# Patient Record
Sex: FEMALE | Race: WHITE | Employment: UNEMPLOYED | URBAN - METROPOLITAN AREA
[De-identification: names, ages, dates, MRNs, and addresses within clinical notes are randomized per-mention and may not be internally consistent; named-entity substitution may affect disease eponyms.]

---

## 2018-05-23 PROBLEM — E66.01 SEVERE OBESITY (BMI 35.0-39.9) WITH COMORBIDITY (HCC): Status: ACTIVE | Noted: 2018-05-23

## 2018-07-02 ENCOUNTER — OFFICE VISIT (OUTPATIENT)
Dept: CARDIOLOGY CLINIC | Age: 48
End: 2018-07-02

## 2018-07-02 VITALS
DIASTOLIC BLOOD PRESSURE: 87 MMHG | HEIGHT: 67 IN | WEIGHT: 229 LBS | SYSTOLIC BLOOD PRESSURE: 130 MMHG | BODY MASS INDEX: 35.94 KG/M2 | HEART RATE: 74 BPM

## 2018-07-02 DIAGNOSIS — R07.9 CHEST PAIN, UNSPECIFIED TYPE: ICD-10-CM

## 2018-07-02 DIAGNOSIS — R94.39 ABNORMAL NUCLEAR STRESS TEST: ICD-10-CM

## 2018-07-02 DIAGNOSIS — I10 ESSENTIAL HYPERTENSION: Primary | ICD-10-CM

## 2018-07-02 DIAGNOSIS — E78.5 HYPERLIPIDEMIA, UNSPECIFIED HYPERLIPIDEMIA TYPE: ICD-10-CM

## 2018-07-02 DIAGNOSIS — R94.31 ABNORMAL EKG: ICD-10-CM

## 2018-07-02 LAB
A-G RATIO,AGRAT: 1.7 RATIO (ref 1.1–2.6)
ABSOLUTE LYMPHOCYTE COUNT, 10803: 2.2 K/UL (ref 1–4.8)
ALBUMIN SERPL-MCNC: 4.5 G/DL (ref 3.5–5)
ALP SERPL-CCNC: 82 U/L (ref 25–115)
ALT SERPL-CCNC: 30 U/L (ref 5–40)
ANION GAP SERPL CALC-SCNC: 12.3 MMOL/L
AST SERPL W P-5'-P-CCNC: 17 U/L (ref 10–37)
BASOPHILS # BLD: 0 K/UL (ref 0–0.2)
BASOPHILS NFR BLD: 1 % (ref 0–2)
BILIRUB SERPL-MCNC: 0.3 MG/DL (ref 0.2–1.2)
BILIRUBIN, DIRECT,CBIL: <0.2 MG/DL (ref 0–0.3)
BUN SERPL-MCNC: 10 MG/DL (ref 6–22)
CALCIUM SERPL-MCNC: 9.9 MG/DL (ref 8.4–10.5)
CHLORIDE SERPL-SCNC: 103 MMOL/L (ref 98–110)
CHOLEST SERPL-MCNC: 247 MG/DL (ref 110–200)
CO2 SERPL-SCNC: 27 MMOL/L (ref 20–32)
CREAT SERPL-MCNC: 0.7 MG/DL (ref 0.5–1.2)
EOSINOPHIL # BLD: 0.1 K/UL (ref 0–0.5)
EOSINOPHIL NFR BLD: 2 % (ref 0–6)
ERYTHROCYTE [DISTWIDTH] IN BLOOD BY AUTOMATED COUNT: 12.6 % (ref 10–15.5)
GFRAA, 66117: >60
GFRNA, 66118: >60
GLOBULIN,GLOB: 2.6 G/DL (ref 2–4)
GLUCOSE SERPL-MCNC: 106 MG/DL (ref 70–99)
GRANULOCYTES,GRANS: 52 % (ref 40–75)
HCT VFR BLD AUTO: 37.7 % (ref 35.1–48)
HDLC SERPL-MCNC: 4.6 MG/DL (ref 0–5)
HDLC SERPL-MCNC: 54 MG/DL (ref 40–59)
HGB BLD-MCNC: 12.8 G/DL (ref 11.7–16)
INR PPP: 0.94 (ref 0.89–1.29)
LDLC SERPL CALC-MCNC: 160 MG/DL (ref 50–99)
LYMPHOCYTES, LYMLT: 39 % (ref 20–45)
MCH RBC QN AUTO: 28 PG (ref 26–34)
MCHC RBC AUTO-ENTMCNC: 34 G/DL (ref 31–36)
MCV RBC AUTO: 83 FL (ref 80–95)
MONOCYTES # BLD: 0.4 K/UL (ref 0.1–1)
MONOCYTES NFR BLD: 6 % (ref 3–12)
NEUTROPHILS # BLD AUTO: 3 K/UL (ref 1.8–7.7)
PLATELET # BLD AUTO: 250 K/UL (ref 140–440)
PMV BLD AUTO: 10.2 FL (ref 9–13)
POTASSIUM SERPL-SCNC: 4.3 MMOL/L (ref 3.5–5.5)
PROT SERPL-MCNC: 7.1 G/DL (ref 6.4–8.3)
PROTHROMBIN TIME: 9.8 SEC (ref 9–13)
RBC # BLD AUTO: 4.52 M/UL (ref 3.8–5.2)
SODIUM SERPL-SCNC: 142 MMOL/L (ref 133–145)
TRIGL SERPL-MCNC: 164 MG/DL (ref 40–149)
VLDLC SERPL CALC-MCNC: 33 MG/DL (ref 8–30)
WBC # BLD AUTO: 5.8 K/UL (ref 4–11)

## 2018-07-02 RX ORDER — AMLODIPINE BESYLATE 2.5 MG/1
2.5 TABLET ORAL DAILY
Qty: 30 TAB | Refills: 3 | Status: SHIPPED | OUTPATIENT
Start: 2018-07-02 | End: 2018-11-08 | Stop reason: SDUPTHER

## 2018-07-02 RX ORDER — ASPIRIN 81 MG/1
81 TABLET ORAL DAILY
Qty: 100 TAB | Refills: 1 | Status: SHIPPED | OUTPATIENT
Start: 2018-07-02 | End: 2018-07-06

## 2018-07-02 RX ORDER — ROSUVASTATIN CALCIUM 20 MG/1
20 TABLET, COATED ORAL
Qty: 20 TAB | Refills: 2 | Status: SHIPPED | OUTPATIENT
Start: 2018-07-02 | End: 2018-10-17 | Stop reason: SDUPTHER

## 2018-07-02 NOTE — PATIENT INSTRUCTIONS
nstructions    Patients Name:  Prachi De La Torre    1. You are scheduled to have a CATH PCI on July 6  At 11:45 Please check in at 11:30.     2. Please go to [facility] and park in the outpatient parking lot that is located around to the back of the hospital and enter through the [employer]. Once you enter through the [facility] check in with the  there. The  will either give you directions or assist you in getting to the cath holding area. 3. You are not to eat anything after midnight before the procedure. Please continue to drink fluids up until midnight.  (water, juice, black coffee, soft drinks). Do not drink milk or milk products or anything with cream. You may take medications(except for diabetes) with a small sip of water before 6am on the day of the procedure. .    4. If you are diabetic, do not take your insulin/sugar pill the morning of the procedure. 5. MEDICATION INSTRUCTIONS:   Please take your morning medications with the following special instructions:    [x]          Please make sure to take your Blood pressure medication with just a little water to swallow: .    []          Take your Aspirin and/or Plavix. []          Stop your Coumadin on   and do not resume it until after the procedure.     []          Take Prednisone 60 mg and Benadryl 25 mg by mouth at Bedtime on  and again on  at . This is to prevent you from having an allergic reaction to the dye. 6. We encourage families to wait in the waiting room on the first floor while the procedure is being done. The Doctor will come out and talk with you as soon as the procedure is over. 7. There is the possibility that you may spend the night in the hospital, depending on the results of the procedure. This will be determined after the procedure is done. If angioplasty or stent is planned, you will stay at least one day.     8. If you or your family have any questions, please call our office Monday -Friday, 9:00 a.m.-4:30 p.m.,  At [phone], and ask to speak to one of the nurses.

## 2018-07-02 NOTE — PROGRESS NOTES
HISTORY OF PRESENT ILLNESS  Arnulfo Tai is a 50 y.o. female. HPI Comments: Patient with abnormal ekg,had a stress test reportedly abnormal.On follow up patient denies any chest pains,sob, palpitation or other significant symptoms. New Patient   The history is provided by the patient. This is a new problem. Associated symptoms include chest pain and shortness of breath. Pertinent negatives include no abdominal pain and no headaches. Chest Pain (Angina)    The history is provided by the patient. This is a new problem. The current episode started more than 1 week ago. The problem has been gradually worsening. The problem occurs every several days. The pain is associated with exertion and rest. The pain is present in the substernal region. The pain is moderate. The quality of the pain is described as pressure-like. The pain radiates to the left neck, left arm and left shoulder. Associated symptoms include shortness of breath. Pertinent negatives include no abdominal pain, no claudication, no cough, no dizziness, no fever, no headaches, no hemoptysis, no nausea, no orthopnea, no palpitations, no PND, no sputum production, no vomiting and no weakness. Review of Systems   Constitutional: Negative for chills and fever. HENT: Negative for nosebleeds. Eyes: Negative for blurred vision and double vision. Respiratory: Positive for shortness of breath. Negative for cough, hemoptysis, sputum production and wheezing. Cardiovascular: Positive for chest pain. Negative for palpitations, orthopnea, claudication, leg swelling and PND. Gastrointestinal: Negative for abdominal pain, heartburn, nausea and vomiting. Musculoskeletal: Negative for myalgias. Skin: Negative for rash. Neurological: Negative for dizziness, weakness and headaches. Endo/Heme/Allergies: Does not bruise/bleed easily.      Family History   Problem Relation Age of Onset    Hypertension Other     High Cholesterol Other     Other Other      AIAT deficiency maternal cousin    Ovarian Cancer Mother     Ovarian Cancer Sister        Past Medical History:   Diagnosis Date    Abnormal nuclear stress test 3/31/2014    fixed ant septal defect likely attenuation normal wall motion. no anginal symptoms     FHx: alpha 1 antitrypsin deficiency     H/O chest tube placement     RIGHT 2012    High cholesterol     History of gynecological procedure     Surrogate mother    Hypertension     Mitral valve prolapse     Nonspecific abnormal electrocardiogram (ECG) (EKG) 3/31/2014    inf q waves has been seen on and off in past no inferior wall defect on recent stress test     Pneumothorax, spontaneous, tension     RIGHT 2012    Renal cell carcinoma of right kidney ([address] 3/1/13    [id number] cc RCC FG2 s/p left radical nephrectomy for 5.1cm renal mass interpolar in 3/13     Renal mass, right     S/p nephrectomy 3/31/2014    for cancer        Past Surgical History:   Procedure Laterality Date    HX BREAST REDUCTION  2017    HX CHOLECYSTECTOMY      HX GYN      HX HYSTERECTOMY      Tummy stuck with surgery    HX NEPHRECTOMY Left 3/1/13    Radical, Dr. Neha Louise, [facility]       Social History   Substance Use Topics    Smoking status: Former Smoker    Smokeless tobacco: Former User     Quit date: 3/31/2010      Comment: quit in     Alcohol use No       Allergies   Allergen Reactions    Morphine Itching       Current Outpatient Prescriptions   Medication Sig    rosuvastatin (CRESTOR) 20 mg tablet Take 1 Tab by mouth nightly.  amLODIPine (NORVASC) 2.5 mg tablet Take 1 Tab by mouth daily.  aspirin delayed-release 81 mg tablet Take 1 Tab by mouth daily.  carvedilol (COREG) 12.5 mg tablet Take 1 Tab by mouth two (2) times daily (with meals). No current facility-administered medications for this visit.         Visit Vitals    /87    Pulse 74    Ht 5' 7\" (1.702 m)    Wt 103.9 kg (229 lb)    BMI 35.87 kg/m2 Physical Exam   Constitutional: She is oriented to person, place, and time. She appears well-developed and well-nourished. HENT:   Head: Normocephalic and atraumatic. Eyes: Conjunctivae are normal.   Neck: Neck supple. No JVD present. No tracheal deviation present. No thyromegaly present. Cardiovascular: Normal rate and regular rhythm. PMI is not displaced. Exam reveals no gallop, no S3 and no decreased pulses. No murmur heard. Pulmonary/Chest: No respiratory distress. She has no wheezes. She has no rales. She exhibits no tenderness. Abdominal: Soft. There is no tenderness. Musculoskeletal: She exhibits no edema. Neurological: She is alert and oriented to person, place, and time. Skin: Skin is warm. Psychiatric: She has a normal mood and affect. 3/2014  EXERCISE NUCLEAR STRESS TEST IS ABNORMAL. A fixed anteroseptal defect is noted suggesting possible LAD distribution disease. Given the normal wall motionone must   consider breast attenuation vs a high grade LAD stenosis   Normal LV systolic function with an EF of 88%   intermediate risk scan   Final Date: 21 March 2014 13:51    9/2015: Echocardiogram exam  NORMAL LEFT VENTRICULAR CAVITY SIZE AND SYSTOLIC FUNCTION WITH AN EJECTION FRACTION OF 65%. DIASTOLIC DYSFUNCTION NOTED. NORMAL RIGHT VENTRICULAR GLOBAL SYSTOLIC FUNCTION. TRICUSPID ANNULAR PLANE SYSTOLIC EXCURSION (TAPSE) IS 1.91 CM. TRACE AORTIC REGURGITATION. NO HEMODYNAMICALLY SIGNIFICANT VALVULAR PATHOLOGY. NO PREVIOUS REPORT FOR COMPARISON. I Have personally reviewed recent relevant labs available and discussed with patient  I have personally reviewed patient's records available from hospital and other providers and incorporated findings in patient care. 7/2018  Sinus  Rhythm   -Old inferior infarct  -Old anterior infarct. ABNORMAL       Assessment       ICD-10-CM ICD-9-CM    1.  Essential hypertension I10 401.9 rosuvastatin (CRESTOR) 20 mg tablet AMB POC EKG ROUTINE W/ 12 LEADS, INTER & REP   2. Hyperlipidemia, unspecified hyperlipidemia type E78.5 272.4 HEPATIC FUNCTION PANEL      LIPID PANEL   3. Abnormal nuclear stress test R94.39 794.39 CARDIAC CATHETERIZATION      CBC WITH AUTOMATED DIFF      PROTHROMBIN TIME + INR      METABOLIC PANEL, BASIC    Fixed anteroseptal defect in 2014. Possible CAD although wall motion and systolic function is normal.   4. Chest pain, unspecified type R07.9 786.50 CARDIAC CATHETERIZATION      CBC WITH AUTOMATED DIFF      PROTHROMBIN TIME + INR      METABOLIC PANEL, BASIC    History of angina pectoris. [address] class III. Increased symptoms. Added medication. We will proceed with cardiac catheterization. 5. Abnormal EKG R94.31 794.31     ? old mi     THE PATIENT UNDERSTANDS THAT ALTHOUGH RARE, SEVERE  UNEXPECTED COMPLICATIONS CAN OCCUR WITH EACH TYPE OF CARDIAC CATH PROCEDURE.   THESE RISKS INCLUDE,  BUT ARE NOT LIMITED TO: ALLERGIC REACTION, INFECTION, BLEEDING, BLOOD VESSEL INJURY,   KIDNEY INJURY FROM X-RAY DYE, PUNCTURE OF THE HEART/LUNGS,   EMERGENT OPEN HEART SURGERY, HEART ATTACK, STROKE, CARDIAC  ARREST OR DEATH OR NEED FOR EMERGENCY CARDIAC BYPASS SURGERY       Medications Discontinued During This Encounter   Medication Reason    rosuvastatin (CRESTOR) 20 mg tablet Reorder       Orders Placed This Encounter    CBC WITH AUTOMATED DIFF     Standing Status:   Future     Standing Expiration Date:   8/1/2018    PROTHROMBIN TIME + INR     Standing Status:   Future     Standing Expiration Date:   7/7/0406    METABOLIC PANEL, BASIC     Standing Status:   Future     Standing Expiration Date:   8/1/2018    HEPATIC FUNCTION PANEL     Standing Status:   Future     Standing Expiration Date:   12/31/2018    LIPID PANEL     Standing Status:   Future     Standing Expiration Date:   12/31/2018    CARDIAC CATHETERIZATION     Standing Status:   Future     Standing Expiration Date:   1/1/2019     Order Specific Question:   Reason for Exam:     Answer:   angina    AMB POC EKG ROUTINE W/ 12 LEADS, INTER & REP     Order Specific Question:   Reason for Exam:     Answer:   chest pain    rosuvastatin (CRESTOR) 20 mg tablet     Sig: Take 1 Tab by mouth nightly. Dispense:  20 Tab     Refill:  2    amLODIPine (NORVASC) 2.5 mg tablet     Sig: Take 1 Tab by mouth daily. Dispense:  30 Tab     Refill:  3    aspirin delayed-release 81 mg tablet     Sig: Take 1 Tab by mouth daily. Dispense:  100 Tab     Refill:  1       Follow-up Disposition:  Return in about 4 weeks (around 7/30/2018).

## 2018-07-02 NOTE — MR AVS SNAPSHOT
Mounika [place] 
 
 
 [address] [facility] 
[phone] Patient: Abbie Buckley MRN: [id number] :1970 Visit Information Date & Time Provider Department Dept. Phone Encounter #  
 2018  9:45 AM Mary Beth Fuentes MD [facility] [id number] Follow-up Instructions Return in about 4 weeks (around 2018). Your Appointments 2018 10:30 AM  
ESTABLISHED PATIENT with Mary Beth Fuentes MD  
[facility] ([address]) Appt Note: 6 wks post cath [address] [place] [address] [address]  
  
   
 [address] [address] [address] [address] Upcoming Health Maintenance Date Due Pneumococcal 19-64 Highest Risk (1 of 3 - PCV13) 10/19/1987 DTaP/Tdap/Td series (1 - Tdap) 10/19/1989 PAP AKA CERVICAL CYTOLOGY 2014 Influenza Age 5 to Adult 2018 Allergies as of 2018  Review Complete On: 2018 By: Arelis Pacheco Severity Noted Reaction Type Reactions Morphine  2012   Intolerance Itching Current Immunizations  Never Reviewed Name Date Influenza Vaccine 2012 12:00 AM  
  
 Not reviewed this visit You Were Diagnosed With   
  
 Codes Comments Essential hypertension    -  Primary ICD-10-CM: I10 
ICD-9-CM: 401.9 Hyperlipidemia, unspecified hyperlipidemia type     ICD-10-CM: E78.5 ICD-9-CM: 272.4 Abnormal nuclear stress test     ICD-10-CM: R94.39 
ICD-9-CM: 794.39 Fixed anteroseptal defect in . Possible CAD although wall motion and systolic function is normal.  
 Chest pain, unspecified type     ICD-10-CM: R07.9 ICD-9-CM: 786.50 History of angina pectoris. [address] class III. Increased symptoms. Added medication. We will proceed with cardiac catheterization. Abnormal EKG     ICD-10-CM: R94.31 
ICD-9-CM: 794.31 ? old mi Vitals BP Pulse Height(growth percentile) Weight(growth percentile) BMI OB Status 130/87 74 5' 7\" (1.702 m) 229 lb (103.9 kg) 35.87 kg/m2 Hysterectomy Smoking Status Former Smoker Vitals History BMI and BSA Data Body Mass Index Body Surface Area  
 35.87 kg/m 2 2.22 m 2 Preferred Pharmacy Pharmacy Name Phone [facility] [address] [place] [address] AT [facility] [phone] Your Updated Medication List  
  
   
This list is accurate as of 7/2/18 10:22 AM.  Always use your most recent med list. amLODIPine 2.5 mg tablet Commonly known as:  [place] Take 1 Tab by mouth daily. aspirin delayed-release 81 mg tablet Take 1 Tab by mouth daily. carvedilol 12.5 mg tablet Commonly known as:  Jean Peat Take 1 Tab by mouth two (2) times daily (with meals). rosuvastatin 20 mg tablet Commonly known as:  CRESTOR Take 1 Tab by mouth nightly. Prescriptions Sent to Pharmacy Refills  
 rosuvastatin (CRESTOR) 20 mg tablet 2 Sig: Take 1 Tab by mouth nightly. Class: Normal  
 Pharmacy: [facility] [address] [place] [address] Ph #: [phone] Route: Oral  
 amLODIPine (NORVASC) 2.5 mg tablet 3 Sig: Take 1 Tab by mouth daily. Class: Normal  
 Pharmacy: [facility] [address] [place] [address] Ph #: [phone] Route: Oral  
 aspirin delayed-release 81 mg tablet 1 Sig: Take 1 Tab by mouth daily. Class: Normal  
 Pharmacy: [facility] [address] [place] [address] [facility]: [phone] Route: Oral  
  
We Performed the Following AMB POC EKG ROUTINE W/ 12 LEADS, INTER & REP [41699 CPT(R)] Follow-up Instructions Return in about 4 weeks (around 7/30/2018). To-Do List   
 07/02/2018 Cardiac Services:  CARDIAC CATHETERIZATION   
  
 07/02/2018 Lab:  HEPATIC FUNCTION PANEL   
  
 07/02/2018 Lab:  LIPID PANEL   
  
 07/09/2018 Lab:  CBC WITH AUTOMATED DIFF   
  
 07/09/2018 Lab:  METABOLIC PANEL, BASIC   
  
 07/09/2018 Lab:  PROTHROMBIN TIME + INR Patient Instructions   
nstructions Patients Name:  Helga Santana 1. You are scheduled to have a CATH PCI on July 13  at  Please check in at   . 2. Please go to [facility] and park in the outpatient parking lot that is located around to the back of the hospital and enter through the [facility] building. Once you enter through the [facility] check in with the  there. The  will either give you directions or assist you in getting to the cath holding area. 3. You are not to eat anything after midnight before the procedure. Please continue to drink fluids up until midnight.  (water, juice, black coffee, soft drinks). Do not drink milk or milk products or anything with cream. You may take medications(except for diabetes) with a small sip of water before 6am on the day of the procedure. Barbie Davis 4. If you are diabetic, do not take your insulin/sugar pill the morning of the procedure. 5. MEDICATION INSTRUCTIONS:   Please take your morning medications with the following special instructions: 
 
[x]          Please make sure to take your Blood pressure medication with just a little water to swallow: . []          Take your Aspirin and/or Plavix. []          Stop your Coumadin on   and do not resume it until after the procedure.  
 
[]          Take Prednisone 60 mg and Benadryl 25 mg by mouth at Bedtime on  and again on  at . This is to prevent you from having an allergic reaction to the dye. 6. We encourage families to wait in the waiting room on the first floor while the procedure is being done.   The Doctor will come out and talk with you as soon as the procedure is over. 7. There is the possibility that you may spend the night in the hospital, depending on the results of the procedure. This will be determined after the procedure is done. If angioplasty or stent is planned, you will stay at least one day. 8. If you or your family have any questions, please call our office Monday Friday, 9:00 a. m.4:30 p.m.,  At [phone], and ask to speak to one of the nurses. Introducing [facility]! [facility] introduces [employer] patient portal. Now you can access parts of your medical record, email your doctor's office, and request medication refills online. 1. In your internet browser, go to [email or web address] 2. Click on the First Time User? Click Here link in the Sign In box. You will see the New Member Sign Up page. 3. Enter your [employer]way Access Code exactly as it appears below. You will not need to use this code after youve completed the sign-up process. If you do not sign up before the expiration date, you must request a new code. · Compare[employer]way Access Code: [email or web address] Expires: 8/21/2018  9:22 AM 
 
4. Enter the last four digits of your Social Security Number (xxxx) and Date of Birth (mm/dd/yyyy) as indicated and click Submit. You will be taken to the next sign-up page. 5. Create a CompareAway ID. This will be your CompareAway login ID and cannot be changed, so think of one that is secure and easy to remember. 6. Create a [employer]way password. You can change your password at any time. 7. Enter your Password Reset Question and Answer. This can be used at a later time if you forget your password. 8. Enter your e-mail address. You will receive e-mail notification when new information is available in [address] 9. Click Sign Up. You can now view and download portions of your medical record. 10. Click the Download Summary menu link to download a portable copy of your medical information. If you have questions, please visit the Frequently Asked Questions section of the [employer] website. Remember, [employer] is NOT to be used for urgent needs. For medical emergencies, dial 911. Now available from your iPhone and Android! Please provide this summary of care documentation to your next provider. Your primary care clinician is listed as Brendon Robin. If you have any questions after today's visit, please call [phone].

## 2018-07-02 NOTE — LETTER
Lauraanali Escobar 1970 
 
7/2/2018 Dear Saurav Blanco NP I had the pleasure of evaluating  Ms. Parmar in office today. Below are the relevant portions of my assessment and plan of care. ICD-10-CM ICD-9-CM 1. Essential hypertension I10 401.9 rosuvastatin (CRESTOR) 20 mg tablet AMB POC EKG ROUTINE W/ 12 LEADS, INTER & REP 2. Hyperlipidemia, unspecified hyperlipidemia type E78.5 272.4 HEPATIC FUNCTION PANEL  
   LIPID PANEL 3. Abnormal nuclear stress test R94.39 794.39 CARDIAC CATHETERIZATION  
   CBC WITH AUTOMATED DIFF PROTHROMBIN TIME + INR  
   METABOLIC PANEL, BASIC Fixed anteroseptal defect in 2014. Possible CAD although wall motion and systolic function is normal.  
4. Chest pain, unspecified type R07.9 786.50 CARDIAC CATHETERIZATION  
   CBC WITH AUTOMATED DIFF PROTHROMBIN TIME + INR  
   METABOLIC PANEL, BASIC History of angina pectoris. [facility] class III. Increased symptoms. Added medication. We will proceed with cardiac catheterization. 5. Abnormal EKG R94.31 794.31   
 ? old mi  
 
 
Current Outpatient Prescriptions Medication Sig Dispense Refill  rosuvastatin (CRESTOR) 20 mg tablet Take 1 Tab by mouth nightly. 20 Tab 2  
 amLODIPine (NORVASC) 2.5 mg tablet Take 1 Tab by mouth daily. 30 Tab 3  
 aspirin delayed-release 81 mg tablet Take 1 Tab by mouth daily. 100 Tab 1  carvedilol (COREG) 12.5 mg tablet Take 1 Tab by mouth two (2) times daily (with meals). 60 Tab 6 Orders Placed This Encounter  CBC WITH AUTOMATED DIFF Standing Status:   Future Standing Expiration Date:   8/1/2018  PROTHROMBIN TIME + INR Standing Status:   Future Standing Expiration Date:   8/1/2018  METABOLIC PANEL, BASIC Standing Status:   Future Standing Expiration Date:   8/1/2018  
 HEPATIC FUNCTION PANEL Standing Status:   Future Standing Expiration Date:   12/31/2018  LIPID PANEL Standing Status:   Future Standing Expiration Date:   12/31/2018  CARDIAC CATHETERIZATION Standing Status:   Future Standing Expiration Date:   1/1/2019 Order Specific Question:   Reason for Exam: Answer:   angina  AMB POC EKG ROUTINE W/ 12 LEADS, INTER & REP Order Specific Question:   Reason for Exam: Answer:   chest pain  rosuvastatin (CRESTOR) 20 mg tablet Sig: Take 1 Tab by mouth nightly. Dispense:  20 Tab Refill:  2  
 amLODIPine (NORVASC) 2.5 mg tablet Sig: Take 1 Tab by mouth daily. Dispense:  30 Tab Refill:  3  
 aspirin delayed-release 81 mg tablet Sig: Take 1 Tab by mouth daily. Dispense:  100 Tab Refill:  1 If you have questions, please do not hesitate to call me. I look forward to following MsNataliia Parmar along with you. Sincerely, Kaushik Alberts MD

## 2018-07-06 ENCOUNTER — HOSPITAL ENCOUNTER (OUTPATIENT)
Age: 48
Setting detail: OUTPATIENT SURGERY
Discharge: HOME | End: 2018-07-06
Attending: INTERNAL MEDICINE | Admitting: INTERNAL MEDICINE
Payer: COMMERCIAL

## 2018-07-06 VITALS
WEIGHT: 229 LBS | SYSTOLIC BLOOD PRESSURE: 122 MMHG | HEIGHT: 67 IN | HEART RATE: 86 BPM | RESPIRATION RATE: 18 BRPM | DIASTOLIC BLOOD PRESSURE: 74 MMHG | BODY MASS INDEX: 35.94 KG/M2 | OXYGEN SATURATION: 96 %

## 2018-07-06 DIAGNOSIS — R07.9 ACUTE CHEST PAIN: ICD-10-CM

## 2018-07-06 DIAGNOSIS — R94.39: ICD-10-CM

## 2018-07-06 LAB — END DIASTOLIC PRESSURE: 24

## 2018-07-06 PROCEDURE — 77030019569 HC BND COMPR RAD TERU -B: Performed by: INTERNAL MEDICINE

## 2018-07-06 PROCEDURE — 74011636320 HC RX REV CODE- 636/320: Performed by: INTERNAL MEDICINE

## 2018-07-06 PROCEDURE — 77030015766: Performed by: INTERNAL MEDICINE

## 2018-07-06 PROCEDURE — 74011000250 HC RX REV CODE- 250: Performed by: INTERNAL MEDICINE

## 2018-07-06 PROCEDURE — 74011250636 HC RX REV CODE- 250/636: Performed by: INTERNAL MEDICINE

## 2018-07-06 PROCEDURE — 77030013797 HC KT TRNSDUC PRSSR EDWD -A: Performed by: INTERNAL MEDICINE

## 2018-07-06 PROCEDURE — 74011250637 HC RX REV CODE- 250/637: Performed by: INTERNAL MEDICINE

## 2018-07-06 PROCEDURE — 93458 L HRT ARTERY/VENTRICLE ANGIO: CPT | Performed by: INTERNAL MEDICINE

## 2018-07-06 PROCEDURE — C1894 INTRO/SHEATH, NON-LASER: HCPCS | Performed by: INTERNAL MEDICINE

## 2018-07-06 RX ORDER — GUAIFENESIN 100 MG/5ML
LIQUID (ML) ORAL AS NEEDED
Status: DISCONTINUED | OUTPATIENT
Start: 2018-07-06 | End: 2018-07-06 | Stop reason: HOSPADM

## 2018-07-06 RX ORDER — HEPARIN SODIUM 1000 [USP'U]/ML
INJECTION, SOLUTION INTRAVENOUS; SUBCUTANEOUS
Status: DISCONTINUED
Start: 2018-07-06 | End: 2018-07-06 | Stop reason: HOSPADM

## 2018-07-06 RX ORDER — SODIUM CHLORIDE 0.9 % (FLUSH) 0.9 %
5-10 SYRINGE (ML) INJECTION AS NEEDED
Status: DISCONTINUED | OUTPATIENT
Start: 2018-07-06 | End: 2018-07-06 | Stop reason: HOSPADM

## 2018-07-06 RX ORDER — GUAIFENESIN 100 MG/5ML
LIQUID (ML) ORAL
Status: DISCONTINUED
Start: 2018-07-06 | End: 2018-07-06 | Stop reason: HOSPADM

## 2018-07-06 RX ORDER — SODIUM CHLORIDE 0.9 % (FLUSH) 0.9 %
5-10 SYRINGE (ML) INJECTION EVERY 8 HOURS
Status: DISCONTINUED | OUTPATIENT
Start: 2018-07-06 | End: 2018-07-06 | Stop reason: HOSPADM

## 2018-07-06 RX ORDER — NITROGLYCERIN 40 MG/100ML
INJECTION INTRAVENOUS
Status: DISCONTINUED
Start: 2018-07-06 | End: 2018-07-06 | Stop reason: HOSPADM

## 2018-07-06 RX ORDER — LIDOCAINE HYDROCHLORIDE 10 MG/ML
INJECTION, SOLUTION EPIDURAL; INFILTRATION; INTRACAUDAL; PERINEURAL AS NEEDED
Status: DISCONTINUED | OUTPATIENT
Start: 2018-07-06 | End: 2018-07-06 | Stop reason: HOSPADM

## 2018-07-06 RX ORDER — LIDOCAINE HYDROCHLORIDE 10 MG/ML
INJECTION, SOLUTION EPIDURAL; INFILTRATION; INTRACAUDAL; PERINEURAL
Status: DISCONTINUED
Start: 2018-07-06 | End: 2018-07-06 | Stop reason: HOSPADM

## 2018-07-06 RX ORDER — VERAPAMIL HYDROCHLORIDE 2.5 MG/ML
INJECTION, SOLUTION INTRAVENOUS AS NEEDED
Status: DISCONTINUED | OUTPATIENT
Start: 2018-07-06 | End: 2018-07-06 | Stop reason: HOSPADM

## 2018-07-06 RX ORDER — VERAPAMIL HYDROCHLORIDE 2.5 MG/ML
INJECTION, SOLUTION INTRAVENOUS
Status: DISCONTINUED
Start: 2018-07-06 | End: 2018-07-06 | Stop reason: HOSPADM

## 2018-07-06 RX ORDER — HEPARIN SODIUM 1000 [USP'U]/ML
INJECTION, SOLUTION INTRAVENOUS; SUBCUTANEOUS AS NEEDED
Status: DISCONTINUED | OUTPATIENT
Start: 2018-07-06 | End: 2018-07-06 | Stop reason: HOSPADM

## 2018-07-06 NOTE — PROGRESS NOTES
RBand removed from right wrist without complication. No hematoma or bleeding noted at this time. Pt instructed on post cath instructions. Pt verbalized understanding.

## 2018-07-06 NOTE — PROGRESS NOTES
Pt provided discharge instructions. All questions answered and pt verbalized understanding. PIV removed. No hematoma or bleeding noted at this time. Procedure site within normal limits. Pt escorted to front of building for discharge.

## 2018-07-06 NOTE — H&P
H&P update    No new symptoms  Risks, benefits and alternatives of LHC/ptca/stent explained to patient.   All questions answered

## 2018-07-06 NOTE — Clinical Note
TRANSFER - OUT REPORT:  
 
Verbal report given to: mike posada RN. Report consisted of patient's Situation, Background, Assessment and  
Recommendations(SBAR). Opportunity for questions and clarification was provided. Patient transported with a Cardiac Cath Tech / Patient Care Tech. Patient transported to: Luca Valerio

## 2018-07-06 NOTE — IP AVS SNAPSHOT
[place] 
 
 
 [address] [facility] [address] [facility] [address] Patient: Annette Tadeo MRN: [id number] :1970 About your hospitalization You were admitted on:  2018 You last received care in the:  [facility] You were discharged on:  2018 Why you were hospitalized Your primary diagnosis was:  Not on File Follow-up Information Follow up With Details Comments Contact Info Francy Taylor NP   [address] [facility] [address] [facility] [address] 
[phone] Judson Beauchamp MD Follow up in 2 week(s)  [address] [place] [address] [facility] [address] 
[phone] Your Scheduled Appointments 2018 10:30 AM EDT  
ESTABLISHED PATIENT with Peter Maguire MD  
[facility] [place] ([address]) [place] [address] [facility] [address]  
[phone] Discharge Orders None A check sim indicates which time of day the medication should be taken. My Medications CONTINUE taking these medications Instructions Each Dose to Equal  
 Morning Noon Evening Bedtime  
 amLODIPine 2.5 mg tablet Commonly known as:  Ministerio Garceser Your last dose was: Your next dose is: Take 1 Tab by mouth daily. 2.5 mg  
    
   
   
   
  
 carvedilol 12.5 mg tablet Commonly known as:  Herman Garcia Your last dose was: Your next dose is: Take 1 Tab by mouth two (2) times daily (with meals). 12.5 mg  
    
   
   
   
  
 rosuvastatin 20 mg tablet Commonly known as:  CRESTOR Your last dose was: Your next dose is: Take 1 Tab by mouth nightly. 20 mg  
    
   
   
   
  
  
STOP taking these medications   
 aspirin delayed-release 81 mg tablet Discharge Instructions DISCHARGE SUMMARY from Nurse PATIENT INSTRUCTIONS: 
 
 
F-face looks uneven A-arms unable to move or move unevenly S-speech slurred or non-existent T-time-call 911 as soon as signs and symptoms begin-DO NOT go Back to bed or wait to see if you get better-TIME IS BRAIN. Warning Signs of HEART ATTACK Call 911 if you have these symptoms: 
? Chest discomfort. Most heart attacks involve discomfort in the center of the chest that lasts more than a few minutes, or that goes away and comes back. It can feel like uncomfortable pressure, squeezing, fullness, or pain. ? Discomfort in other areas of the upper body. Symptoms can include pain or discomfort in one or both arms, the back, neck, jaw, or stomach. ? Shortness of breath with or without chest discomfort. ? Other signs may include breaking out in a cold sweat, nausea, or lightheadedness. Don't wait more than five minutes to call [address]! Fast action can save your life. Calling 911 is almost always the fastest way to get lifesaving treatment. Emergency Medical Services staff can begin treatment when they arrive  up to an hour sooner than if someone gets to the hospital by car. The discharge information has been reviewed with the patient. The patient verbalized understanding. Discharge medications reviewed with the patient and appropriate educational materials and side effects teaching were provided. ___________________________________________________________________________________________________________________________________ Cardiac Catheterization *Check the puncture site frequently for swelling or bleeding. If you see any bleeding, lie down and apply pressure over the area with a clean town or washcloth. Notify your doctor for any redness, swelling, drainage or oozing from the puncture site. Notify your doctor for any fever or chills. *If the leg or arm with the puncture becomes cold, numb or painful, call Dr Marta Simmons *Activity should be limited for the next 48 hours. Climb stairs as little as possible and avoid any stooping, bending or strenuous activity for 48 hours. No heavy lifting (anything over 10 pounds) for three days. *Do not drive for 48 hours. *You may resume your usual diet. Drink more fluids than usual. 
 
*Have a responsible person drive you home and stay with you for at least 24 hours after your heart catheterization/angiography. *You may remove the bandage from your Right and Arm in 24 hours. You may shower in 24 hours. No tub baths, hot tubs or swimming for one week. Do not place any lotions, creams, powders, ointments over the puncture site for one week. You may place a clean band-aid over the puncture site each day for 5 days. Change this daily. Patient armband removed and shredded Introducing [facility]! Stacia Duke introduces [facility] ([facility] [place]) patient portal. Now you can access parts of your medical record, email your doctor's office, and request medication refills online. 1. In your internet browser, go to [email or web address] 2. Click on the First Time User? Click Here link in the Sign In box. You will see the New Member Sign Up page. 3. Enter your [facility] ([facility] [place]) Access Code exactly as it appears below. You will not need to use this code after youve completed the sign-up process. If you do not sign up before the expiration date, you must request a new code. · [facility] ([facility] [place]) Access Code: [email or web address] Expires: 8/21/2018  9:22 AM 
 
 4. Enter the last four digits of your Social Security Number (xxxx) and Date of Birth (mm/dd/yyyy) as indicated and click Submit. You will be taken to the next sign-up page. 5. Create a Acusphere ID. This will be your Acusphere login ID and cannot be changed, so think of one that is secure and easy to remember. 6. Create a [employer] password. You can change your password at any time. 7. Enter your Password Reset Question and Answer. This can be used at a later time if you forget your password. 8. Enter your e-mail address. You will receive e-mail notification when new information is available in [address] 9. Click Sign Up. You can now view and download portions of your medical record. 10. Click the Download Summary menu link to download a portable copy of your medical information. If you have questions, please visit the Frequently Asked Questions section of the Ac[employer]here website. Remember, [employer] is NOT to be used for urgent needs. For medical emergencies, dial 911. Now available from your iPhone and Android! Introducing Xavi Bill As a [facility] patient, I wanted to make you aware of our electronic visit tool called Xavi SaldanaSafety Hound. [facility] 24/7 allows you to connect within minutes with a medical provider 24 hours a day, seven days a week via a mobile device or tablet or logging into a secure website from your computer. You can access Xavi Lesfin from anywhere in the [place]. A virtual visit might be right for you when you have a simple condition and feel like you just dont want to get out of bed, or cant get away from work for an appointment, when your regular [facility]ley provider is not available (evenings, weekends or holidays), or when youre out of town and need minor care.   Electronic visits cost only $49 and if the Xavi Bill provider determines a prescription is needed to treat your condition, one can be electronically transmitted to a nearby pharmacy*. Please take a moment to enroll today if you have not already done so. The enrollment process is free and takes just a few minutes. To enroll, please download the [employer] 24/7 [email or web address] to your tablet or phone, or visit [email or web address] to enroll on your computer. And, as an [address] [place] [address] patient with a [employer] account, the results of your visits will be scanned into your electronic medical record and your primary care provider will be able to view the scanned results. We urge you to continue to see your regular [employer] provider for your ongoing medical care. And while your primary care provider may not be the one available when you seek a Intelligize virtual visit, the peace of mind you get from getting a real diagnosis real time can be priceless. For more information on Intelligize, view our Frequently Asked Questions (FAQs) at [email or web address]. Sincerely, 
 
Teresa Guallpa MD 
Chief Medical Officer [facility] Viktoria Morales *:  certain medications cannot be prescribed via Intelligize Providers Seen During Your Hospitalization Provider Specialty Primary office phone Felipe Causey MD Cardiology [phone] Your Primary Care Physician (PCP) Primary Care Physician Office Phone Office Fax Ananya Vu [phone] You are allergic to the following Allergen Reactions Morphine Itching Recent Documentation Height Weight Breastfeeding? BMI OB Status Smoking Status 1.702 m 103.9 kg No 35.87 kg/m2 Hysterectomy Former Smoker Emergency Contacts Name Discharge Info Relation Home Work Mobile Dennis Shultz  Spouse [3] [phone] Micah Parmar  Spouse [3] [phone] Liam Magallon  Parent [1] [phone] Patient Belongings The following personal items are in your possession at time of discharge: 
  Dental Appliances: None  Visual Aid: None Please provide this summary of care documentation to your next provider. Signatures-by signing, you are acknowledging that this After Visit Summary has been reviewed with you and you have received a copy. Patient Signature:  ____________________________________________________________ Date:  ____________________________________________________________  
  
Otto Braintree Provider Signature:  ____________________________________________________________ Date:  ____________________________________________________________

## 2018-07-06 NOTE — DISCHARGE INSTRUCTIONS
DISCHARGE SUMMARY from Nurse    PATIENT INSTRUCTIONS:    After general anesthesia or intravenous sedation, for 24 hours or while taking prescription Narcotics:  · Limit your activities  · Do not drive and operate hazardous machinery  · Do not make important personal or business decisions  · Do  not drink alcoholic beverages  · If you have not urinated within 8 hours after discharge, please contact your surgeon on call. Report the following to your surgeon:  · Excessive pain, swelling, redness or odor of or around the surgical area  · Temperature over 100.5  · Nausea and vomiting lasting longer than 4 hours or if unable to take medications  · Any signs of decreased circulation or nerve impairment to extremity: change in color, persistent  numbness, tingling, coldness or increase pain  · Any questions    What to do at Home:  Recommended activity: Activity as tolerated and no driving for today,     If you experience any of the following symptoms fever greater than 100.5, bleeding, swelling, numbness or tingling down your extremities, redness, puss from site, please follow up with emergency services. *  Please give a list of your current medications to your Primary Care Provider. *  Please update this list whenever your medications are discontinued, doses are      changed, or new medications (including over-the-counter products) are added. *  Please carry medication information at all times in case of emergency situations. These are general instructions for a healthy lifestyle:    No smoking/ No tobacco products/ Avoid exposure to second hand smoke  Surgeon General's Warning:  Quitting smoking now greatly reduces serious risk to your health.     Obesity, smoking, and sedentary lifestyle greatly increases your risk for illness    A healthy diet, regular physical exercise & weight monitoring are important for maintaining a healthy lifestyle    You may be retaining fluid if you have a history of heart failure or if you experience any of the following symptoms:  Weight gain of 3 pounds or more overnight or 5 pounds in a week, increased swelling in our hands or feet or shortness of breath while lying flat in bed. Please call your doctor as soon as you notice any of these symptoms; do not wait until your next office visit. Recognize signs and symptoms of STROKE:    F-face looks uneven    A-arms unable to move or move unevenly    S-speech slurred or non-existent    T-time-call 911 as soon as signs and symptoms begin-DO NOT go       Back to bed or wait to see if you get better-TIME IS BRAIN. Warning Signs of HEART ATTACK     Call 911 if you have these symptoms:   Chest discomfort. Most heart attacks involve discomfort in the center of the chest that lasts more than a few minutes, or that goes away and comes back. It can feel like uncomfortable pressure, squeezing, fullness, or pain.  Discomfort in other areas of the upper body. Symptoms can include pain or discomfort in one or both arms, the back, neck, jaw, or stomach.  Shortness of breath with or without chest discomfort.  Other signs may include breaking out in a cold sweat, nausea, or lightheadedness. Don't wait more than five minutes to call 911 - MINUTES MATTER! Fast action can save your life. Calling 911 is almost always the fastest way to get lifesaving treatment. Emergency Medical Services staff can begin treatment when they arrive -- up to an hour sooner than if someone gets to the hospital by car. The discharge information has been reviewed with the patient. The patient verbalized understanding. Discharge medications reviewed with the patient and appropriate educational materials and side effects teaching were provided.   ___________________________________________________________________________________________________________________________________                     Cardiac Catheterization    *Check the puncture site frequently for swelling or bleeding. If you see any bleeding, lie down and apply pressure over the area with a clean town or washcloth. Notify your doctor for any redness, swelling, drainage or oozing from the puncture site. Notify your doctor for any fever or chills. *If the leg or arm with the puncture becomes cold, numb or painful, call Dr Heather Monroy     *Activity should be limited for the next 48 hours. Climb stairs as little as possible and avoid any stooping, bending or strenuous activity for 48 hours. No heavy lifting (anything over 10 pounds) for three days. *Do not drive for 48 hours. *You may resume your usual diet. Drink more fluids than usual.    *Have a responsible person drive you home and stay with you for at least 24 hours after your heart catheterization/angiography. *You may remove the bandage from your Right and Arm in 24 hours. You may shower in 24 hours. No tub baths, hot tubs or swimming for one week. Do not place any lotions, creams, powders, ointments over the puncture site for one week. You may place a clean band-aid over the puncture site each day for 5 days. Change this daily.     Patient armband removed and shredded

## 2018-07-06 NOTE — Clinical Note
TRANSFER - IN REPORT:  
 
Verbal report received from: mike posada rn. Report consisted of patient's Situation, Background, Assessment and  
Recommendations(SBAR). Opportunity for questions and clarification was provided. Assessment completed upon patient's arrival to unit and care assumed. Patient transported with a Cardiac Cath Tech / Patient Care Tech.

## 2018-07-06 NOTE — Clinical Note
MACD (Max Contrast Calc Dose): 
Patient weight (kg) = 103. Creatinine level (mg/dL) = 0.7. Contrast concentration (mg/mL) = 300. MACD = 300 mL.

## 2018-07-31 ENCOUNTER — OFFICE VISIT (OUTPATIENT)
Dept: CARDIOLOGY CLINIC | Age: 48
End: 2018-07-31

## 2018-07-31 VITALS
DIASTOLIC BLOOD PRESSURE: 84 MMHG | BODY MASS INDEX: 36.1 KG/M2 | HEART RATE: 69 BPM | HEIGHT: 67 IN | SYSTOLIC BLOOD PRESSURE: 129 MMHG | WEIGHT: 230 LBS

## 2018-07-31 DIAGNOSIS — E78.5 HYPERLIPIDEMIA, UNSPECIFIED HYPERLIPIDEMIA TYPE: ICD-10-CM

## 2018-07-31 DIAGNOSIS — I10 ESSENTIAL HYPERTENSION: ICD-10-CM

## 2018-07-31 DIAGNOSIS — I20.9 ANGINA PECTORIS (HCC): Primary | ICD-10-CM

## 2018-07-31 DIAGNOSIS — Z99.89 OSA ON CPAP: ICD-10-CM

## 2018-07-31 DIAGNOSIS — G47.33 OSA ON CPAP: ICD-10-CM

## 2018-07-31 NOTE — PROGRESS NOTES
HISTORY OF PRESENT ILLNESS Jovita Crowder is a 50 y.o. female. HPI Comments: Patient with abnormal ekg,had a stress test reportedly abnormal.On follow up patient denies any chest pains,sob, palpitation or other significant symptoms. Cholesterol Problem Associated symptoms include chest pain and shortness of breath. Pertinent negatives include no abdominal pain and no headaches. Hypertension Associated symptoms include chest pain and shortness of breath. Pertinent negatives include no abdominal pain and no headaches. New Patient The history is provided by the patient. This is a new problem. Associated symptoms include chest pain and shortness of breath. Pertinent negatives include no abdominal pain and no headaches. Chest Pain (Angina) The history is provided by the patient. This is a new problem. The current episode started more than 1 week ago. The problem has been gradually worsening. The problem occurs every several days. The pain is associated with exertion and rest. The pain is present in the substernal region. The pain is moderate. The quality of the pain is described as pressure-like. The pain radiates to the left neck, left arm and left shoulder. Associated symptoms include shortness of breath. Pertinent negatives include no abdominal pain, no claudication, no cough, no dizziness, no fever, no headaches, no hemoptysis, no nausea, no orthopnea, no palpitations, no PND, no sputum production, no vomiting and no weakness. Review of Systems Constitutional: Negative for chills and fever. HENT: Negative for nosebleeds. Eyes: Negative for blurred vision and double vision. Respiratory: Positive for shortness of breath. Negative for cough, hemoptysis, sputum production and wheezing. Cardiovascular: Positive for chest pain. Negative for palpitations, orthopnea, claudication, leg swelling and PND. Gastrointestinal: Negative for abdominal pain, heartburn, nausea and vomiting. Musculoskeletal: Negative for myalgias. Skin: Negative for rash. Neurological: Negative for dizziness, weakness and headaches. Endo/Heme/Allergies: Does not bruise/bleed easily. Family History Problem Relation Age of Onset  Hypertension Other  High Cholesterol Other  Other Other AIAT deficiency maternal cousin  Ovarian Cancer Mother  Ovarian Cancer Sister Past Medical History:  
Diagnosis Date  Abnormal nuclear stress test 3/31/2014  
 fixed ant septal defect likely attenuation normal wall motion. no anginal symptoms  Angina pectoris ([facility] [address] 2018  
 no cad possible vasospastic feels better with amlodipine  FHx: alpha 1 antitrypsin deficiency  H/O chest tube placement RIGHT 2012  High cholesterol  History of gynecological procedure Surrogate mother  Hypertension  Mitral valve prolapse  Nonspecific abnormal electrocardiogram (ECG) (EKG) 3/31/2014 inf q waves has been seen on and off in past no inferior wall defect on recent stress test   
 Pneumothorax, spontaneous, tension RIGHT 2012  Renal cell carcinoma of right kidney ([facility] [address] 3/1/13 [id number] cc RCC FG2 s/p left radical nephrectomy for 5.1cm renal mass interpolar in 3/13  Renal mass, right  S/p nephrectomy 3/31/2014  
 for cancer Past Surgical History:  
Procedure Laterality Date  HX BREAST REDUCTION  2017  HX CHOLECYSTECTOMY  HX GYN    HX HYSTERECTOMY Tummy stuck with surgery  HX NEPHRECTOMY Left 3/1/13 Radical, Dr. Tabitha Dawson, [facility] Social History Substance Use Topics  Smoking status: Former Smoker  Smokeless tobacco: Former User Quit date: 3/31/2010 Comment: quit in   Alcohol use No  
 
 
Allergies Allergen Reactions  Morphine Itching Current Outpatient Prescriptions Medication Sig  
 rosuvastatin (CRESTOR) 20 mg tablet Take 1 Tab by mouth nightly.   
 amLODIPine (NORVASC) 2.5 mg tablet Take 1 Tab by mouth daily.  carvedilol (COREG) 12.5 mg tablet Take 1 Tab by mouth two (2) times daily (with meals). No current facility-administered medications for this visit. Facility-Administered Medications Ordered in Other Visits Medication Dose Route Frequency  iopamidol (ISOVUE 300) 61 % contrast injection    PRN Visit Vitals  /84  Pulse 69  Ht 5' 7\" (1.702 m)  Wt 104.3 kg (230 lb)  BMI 36.02 kg/m2 Physical Exam  
Constitutional: She is oriented to person, place, and time. She appears well-developed and well-nourished. HENT:  
Head: Normocephalic and atraumatic. Eyes: Conjunctivae are normal.  
Neck: Neck supple. No JVD present. No tracheal deviation present. No thyromegaly present. Cardiovascular: Normal rate and regular rhythm. PMI is not displaced. Exam reveals no gallop, no S3 and no decreased pulses. No murmur heard. Pulmonary/Chest: No respiratory distress. She has no wheezes. She has no rales. She exhibits no tenderness. Abdominal: Soft. There is no tenderness. Musculoskeletal: She exhibits no edema. Neurological: She is alert and oriented to person, place, and time. Skin: Skin is warm. Psychiatric: She has a normal mood and affect. 3/2014 EXERCISE NUCLEAR STRESS TEST IS ABNORMAL. A fixed anteroseptal defect is noted suggesting possible LAD distribution disease. Given the normal wall motionone must 
 consider breast attenuation vs a high grade LAD stenosis Normal LV systolic function with an EF of 88% 
 intermediate risk scan Final Date: 21 March 2014 13:51 
 
9/2015: Echocardiogram exam 
NORMAL LEFT VENTRICULAR CAVITY SIZE AND SYSTOLIC FUNCTION WITH AN EJECTION FRACTION OF 65%. DIASTOLIC DYSFUNCTION NOTED. NORMAL RIGHT VENTRICULAR GLOBAL SYSTOLIC FUNCTION. TRICUSPID ANNULAR PLANE SYSTOLIC EXCURSION (TAPSE) IS 1.91 CM. TRACE AORTIC REGURGITATION.  NO HEMODYNAMICALLY SIGNIFICANT VALVULAR PATHOLOGY. NO PREVIOUS REPORT FOR COMPARISON. I Have personally reviewed recent relevant labs available and discussed with patient I have personally reviewed patient's records available from hospital and other providers and incorporated findings in patient care. 7/2018 Sinus  Rhythm  
-Old inferior infarct  -Old anterior infarct. ABNORMAL Coronary Findings 7/2018 Dominance: Right Left Main The vessel was visualized by angiography. The vessel is angiographically normal.  
  
Left Anterior Descending The vessel was visualized by angiography. The vessel is angiographically normal.  
  
Left Circumflex The vessel was visualized by angiography. The vessel is angiographically normal.  
  
Right Coronary Artery The vessel was visualized by angiography. The vessel is angiographically normal.  
  
Measurements EDP: 24 Left Heart Left Ventricle The left ventricular size is normal. The left ventricular systolic function is normal. LV systolic pressure is normal. LV end diastolic pressure is normal. The estimated EF = grossly normal. There are no wall motion abnormalities in the left ventricle. The outflow tract is normal.  
 
 
Assessment ICD-10-CM ICD-9-CM 1. Angina pectoris (HCC) I20.9 413.9   
 no cad 
possible vasospastic 
feels better with amlodipine 2. Essential hypertension I10 401.9   
 stable 3. Hyperlipidemia, unspecified hyperlipidemia type E78.5 272.4 LIPID PANEL  
   HEPATIC FUNCTION PANEL  
 ldl 160 
on crestor 
follow up lab with pcp 4. JOHANNY on CPAP G47.33 327.23   
 Z99.89 V46.8   
 
7/2018 Arm and neck pains are better with amlodipine. Possible vasospastic angina. No significant CAD on cath. We will continue amlodipine. Also has high LDL. Back on statin rosuvastatin 20 mg a day. Lab with PCP in a few months There are no discontinued medications. Orders Placed This Encounter  LIPID PANEL Standing Status:   Future   Standing Expiration Date:   1/29/2019  
 HEPATIC FUNCTION PANEL Standing Status:   Future Standing Expiration Date:   1/29/2019 Follow-up Disposition: 
Return in about 6 months (around 1/31/2019).

## 2018-07-31 NOTE — MR AVS SNAPSHOT
[address] [facility] 
 
 
 [address] [facility] 
[phone] Patient: Artis Keenan MRN: [id number] :1970 Visit Information Date & Time Provider Department Dept. Phone Encounter #  
 2018 11:00 AM Gaby Bahena MD [facility] [id number] Follow-up Instructions Return in about 6 months (around 2019). Your Appointments 2019  8:30 AM  
ESTABLISHED PATIENT with Althea Mercado MD  
[facility] Appt Note: Return in about 1 year (around 2019) for review labs/imaging.  [facility] [address] [id number] [address] 10[address] [facility]  
  
   
  [facility] [address] [facility] 100 200 [facility] Upcoming Health Maintenance Date Due Pneumococcal 19-64 Highest Risk (1 of 3 - PCV13) 10/19/1987 DTaP/Tdap/Td series (1 - Tdap) 10/19/1989 PAP AKA CERVICAL CYTOLOGY 2014 Influenza Age 5 to Adult 2018 Allergies as of 2018  Review Complete On: 2018 By: Gaby Bahena MD  
  
 Severity Noted Reaction Type Reactions Morphine  2012   Intolerance Itching Current Immunizations  Never Reviewed Name Date Influenza Vaccine 2012 12:00 AM  
  
 Not reviewed this visit You Were Diagnosed With   
  
 Codes Comments Angina pectoris ([facility] [address]    -  Primary ICD-10-CM: I20.9 ICD-9-CM: 413.9 no cad 
possible vasospastic 
feels better with amlodipine Essential hypertension     ICD-10-CM: I10 
ICD-9-CM: 401.9 stable Hyperlipidemia, unspecified hyperlipidemia type     ICD-10-CM: E78.5 ICD-9-CM: 272.4 ldl 160 
on crestor 
follow up lab with pcp JOHANNY on CPAP     ICD-10-CM: G47.33, Z99.89 ICD-9-CM: 327.23, V46.8 Vitals BP Pulse Height(growth percentile) Weight(growth percentile) BMI OB Status 129/84 69 5' 7\" (1.702 m) 230 lb (104.3 kg) 36.02 kg/m2 Hysterectomy Smoking Status Former Smoker Vitals History BMI and BSA Data Body Mass Index Body Surface Area 36.02 kg/m 2 2.22 m 2 Preferred Pharmacy Pharmacy Name Phone [facility] [address] [place] [address], [facility] [phone] Your Updated Medication List  
  
   
This list is accurate as of 7/31/18 11:16 AM.  Always use your most recent med list. amLODIPine 2.5 mg tablet Commonly known as:  Voncile [place] Take 1 Tab by mouth daily. carvedilol 12.5 mg tablet Commonly known as:  Media [place] Take 1 Tab by mouth two (2) times daily (with meals). rosuvastatin 20 mg tablet Commonly known as:  CRESTOR Take 1 Tab by mouth nightly. Follow-up Instructions Return in about 6 months (around 1/31/2019). Introducing \[facility]\"" & HEALTH SERVICES! Dayana Dee introduces [employer] patient portal. Now you can access parts of your medical record, email your doctor's office, and request medication refills online. 1. In your internet browser, go to [email or web address] 2. Click on the First Time User? Click Here link in the Sign In box. You will see the New Member Sign Up page. 3. Enter your Palmap Access Code exactly as it appears below. You will not need to use this code after youve completed the sign-up process. If you do not sign up before the expiration date, you must request a new code. · Palmap Access Code: [email or web address] Expires: 8/21/2018  9:22 AM 
 
4. Enter the last four digits of your Social Security Number (xxxx) and Date of Birth (mm/dd/yyyy) as indicated and click Submit. You will be taken to the next sign-up page. 5. Create a Palmap ID. This will be your Palmap login ID and cannot be changed, so think of one that is secure and easy to remember. 6. Create a [employer] password. You can change your password at any time. 7. Enter your Password Reset Question and Answer. This can be used at a later time if you forget your password. 8. Enter your e-mail address. You will receive e-mail notification when new information is available in [address] 9. Click Sign Up. You can now view and download portions of your medical record. 10. Click the Download Summary menu link to download a portable copy of your medical information. If you have questions, please visit the Frequently Asked Questions section of the [employer] website. Remember, [employer] is NOT to be used for urgent needs. For medical emergencies, dial 911. Now available from your iPhone and Android! Please provide this summary of care documentation to your next provider. Your primary care clinician is listed as Melania Dawson. If you have any questions after today's visit, please call [phone].

## 2018-07-31 NOTE — PROGRESS NOTES
1. Have you been to the ER, urgent care clinic since your last visit? Hospitalized since your last visit? No 
 
2. Have you seen or consulted any other health care providers outside of the [facility] since your last visit? Include any pap smears or colon screening. No  
 
3. Since your last visit, have you had any of the following symptoms? .  
 
  
 
4. Have you had any blood work, X-rays or cardiac testing? 5. Where do you normally have your labs drawn? 6. Do you need any refills today?

## 2018-07-31 NOTE — LETTER
Patricia Ferraro 1970 
 
7/31/2018 Dear Monica River NP I had the pleasure of evaluating  Ms. Parmar in office today. Below are the relevant portions of my assessment and plan of care. ICD-10-CM ICD-9-CM 1. Angina pectoris (HCC) I20.9 413.9   
 no cad 
possible vasospastic 
feels better with amlodipine 2. Essential hypertension I10 401.9   
 stable 3. Hyperlipidemia, unspecified hyperlipidemia type E78.5 272.4   
 ldl 160 
on crestor 
follow up lab with pcp 4. JOHANNY on CPAP G47.33 327.23   
 Z99.89 V46.8 Current Outpatient Prescriptions Medication Sig Dispense Refill  rosuvastatin (CRESTOR) 20 mg tablet Take 1 Tab by mouth nightly. 20 Tab 2  
 amLODIPine (NORVASC) 2.5 mg tablet Take 1 Tab by mouth daily. 30 Tab 3  carvedilol (COREG) 12.5 mg tablet Take 1 Tab by mouth two (2) times daily (with meals). 60 Tab 6 Facility-Administered Medications Ordered in Other Visits Medication Dose Route Frequency Provider Last Rate Last Dose  iopamidol (ISOVUE 300) 61 % contrast injection    PRN Kenyatta Elizondo MD   40 mL at 07/06/18 1401 No orders of the defined types were placed in this encounter. If you have questions, please do not hesitate to call me. I look forward to following Ms. Parmar along with you. Sincerely, Joselito Tillman MD

## 2018-10-17 DIAGNOSIS — I10 ESSENTIAL HYPERTENSION: ICD-10-CM

## 2018-10-17 RX ORDER — ROSUVASTATIN CALCIUM 20 MG/1
20 TABLET, COATED ORAL
Qty: 30 TAB | Refills: 6 | Status: SHIPPED | OUTPATIENT
Start: 2018-10-17

## 2018-11-08 RX ORDER — AMLODIPINE BESYLATE 2.5 MG/1
2.5 TABLET ORAL DAILY
Qty: 30 TAB | Refills: 6 | Status: SHIPPED | OUTPATIENT
Start: 2018-11-08 | End: 2019-08-24 | Stop reason: SDUPTHER

## 2019-02-21 ENCOUNTER — OFFICE VISIT (OUTPATIENT)
Dept: SURGERY | Age: 49
End: 2019-02-21

## 2019-02-21 VITALS
WEIGHT: 230 LBS | TEMPERATURE: 98.1 F | SYSTOLIC BLOOD PRESSURE: 123 MMHG | OXYGEN SATURATION: 97 % | DIASTOLIC BLOOD PRESSURE: 84 MMHG | RESPIRATION RATE: 18 BRPM | BODY MASS INDEX: 36.96 KG/M2 | HEIGHT: 66 IN | HEART RATE: 76 BPM

## 2019-02-21 DIAGNOSIS — Z99.89 OSA ON CPAP: ICD-10-CM

## 2019-02-21 DIAGNOSIS — I10 ESSENTIAL HYPERTENSION: ICD-10-CM

## 2019-02-21 DIAGNOSIS — G47.33 OSA ON CPAP: ICD-10-CM

## 2019-02-21 DIAGNOSIS — E66.01 MORBID OBESITY (HCC): Primary | ICD-10-CM

## 2019-02-21 DIAGNOSIS — C64.2 CANCER OF LEFT KIDNEY (HCC): ICD-10-CM

## 2019-02-21 DIAGNOSIS — E55.9 VITAMIN D DEFICIENCY: ICD-10-CM

## 2019-02-21 RX ORDER — DEXTROAMPHETAMINE SACCHARATE, AMPHETAMINE ASPARTATE, DEXTROAMPHETAMINE SULFATE AND AMPHETAMINE SULFATE 2.5; 2.5; 2.5; 2.5 MG/1; MG/1; MG/1; MG/1
TABLET ORAL
Refills: 0 | COMMUNITY
Start: 2019-01-21

## 2019-02-21 NOTE — PROGRESS NOTES
Initial Consultation for Bariatric Surgery Template (Gastric Sleeve)    Hussain Vera is a 46 y.o. female who comes into the office today for initial consultation for the surgical options for the treatment of morbid obesity. The patient initially identified obesity at the age of 53 and at age 23 weighed 155 lbs. She has tried a variety of unsupervised weight-loss attempts including Weight Watchers, registered dietician, Medi-Fast, Karan Grate and Nutrisystem, but has yet to meet with lasting success. Maximum weight lost on a diet is about 15 lbs, but that the weight loss always seems to return. Today, the patient is  Height: 5' 6\" (167.6 cm) tall, Weight: 104.3 kg (230 lb) lbs for a Body mass index is 37.12 kg/m². It is due to the patient's severe obesity, which is further complicated by hypertension, hyperlipidemia and obstructive sleep apnea - CPAP  that the patient is now seeking out bariatric surgery. Past Medical History:   Diagnosis Date    Abnormal nuclear stress test 3/31/2014    fixed ant septal defect likely attenuation normal wall motion. no anginal symptoms     Angina pectoris ([address] 7/31/2018    no cad possible vasospastic feels better with amlodipine    FHx: alpha 1 antitrypsin deficiency     H/O chest tube placement     RIGHT NOV 2012    High cholesterol     History of gynecological procedure     Surrogate mother    Hypertension     Mitral valve prolapse     Nonspecific abnormal electrocardiogram (ECG) (EKG) 3/31/2014    inf q waves has been seen on and off in past no inferior wall defect on recent stress test     Pneumothorax, spontaneous, tension     RIGHT NOV 2012    Renal cell carcinoma of right kidney ([address] 3/1/13    [id number] cc RCC FG2 s/p left radical nephrectomy for 5.1cm renal mass interpolar in 3/13     Renal mass, right     S/p nephrectomy 3/31/2014    for cancer        Past Surgical History:   Procedure Laterality Date    HX BREAST REDUCTION  11/2017    HX CHOLECYSTECTOMY      HX GYN      HX HYSTERECTOMY      Tummy stuck with surgery    HX NEPHRECTOMY Left 3/1/13    Radical, Dr. John Hagen, [address]       Current Outpatient Medications   Medication Sig Dispense Refill    dextroamphetamine-amphetamine (ADDERALL) 10 mg tablet   0    amLODIPine (NORVASC) 2.5 mg tablet Take 1 Tab by mouth daily. 30 Tab 6    rosuvastatin (CRESTOR) 20 mg tablet Take 1 Tab by mouth nightly. 30 Tab 6    carvedilol (COREG) 12.5 mg tablet Take 1 Tab by mouth two (2) times daily (with meals).  60 Tab 6     Facility-Administered Medications Ordered in Other Visits   Medication Dose Route Frequency Provider Last Rate Last Dose    iopamidol (ISOVUE 300) 61 % contrast injection    PRN Suzie Love MD   40 mL at 18 1401       Allergies   Allergen Reactions    Morphine Itching       Social History     Tobacco Use    Smoking status: Former Smoker    Smokeless tobacco: Former User     Quit date: 3/31/2010    Tobacco comment: quit in    Substance Use Topics    Alcohol use: No    Drug use: No       Family History   Problem Relation Age of Onset    Hypertension Other     High Cholesterol Other     Other Other         AIAT deficiency maternal cousin    Ovarian Cancer Mother     High Cholesterol Mother     Hypertension Mother     Cancer Mother     Ovarian Cancer Sister     Hypertension Father     Elevated Lipids Father     Gall Bladder Disease Father        Family Status   Relation Name Status    Other  (Not Specified)    Other  (Not Specified)    Mother  Alive    Sister  (Not Specified)    Father  Alive       Review of Systems:  Positive in BOLD    CONST: Fever, weight loss, fatigue or chills  GI: Nausea, vomiting, abdominal pain, change in bowel habits, hematochezia, melena, and GERD   INTEG: Dermatitis, abnormal moles  HEENT: Recent changes in vision, vertigo, epistaxis, dysphagia and hoarseness  CV: Chest pain, palpitations, HTN, edema and varicosities  RESP: Cough, shortness of breath, wheezing, hemoptysis, snoring and reactive airway disease  : Hematuria, dysuria, frequency, urgency, nocturia and stress urinary incontinence   MS: Weakness, joint pain and arthritis  ENDO: Diabetes, thyroid disease, polyuria, polydipsia, polyphagia, poor wound healing, heat intolerance, cold intolerance  LYMPH/HEME: Anemia, bruising and history of blood transfusions  NEURO: Dizziness, headache, fainting, seizures and stroke  PSYCH: Anxiety and depression      Physical Exam    Visit Vitals  Ht 5' 6\" (1.676 m)   Wt 104.3 kg (230 lb)   BMI 37.12 kg/m²               General: 46 y.o.) female in no acute distress. Morbidly obese in hips and abdomen - gynecoid pattern  HEENT: Normocephalic, atraumatic, Pupils equal and reactive, nasopharynx clear, oropharynx clear and moist without lesions  NECK: Supple, no lymphadenopathy, thyromegaly, carotid bruits or jugular venous distension. trachea midline  RESP: Clear to auscultation bilaterally, no wheezes, rhonchi, or rales, normal respiratory excursion  CV: Regular rate and rhythm, no murmurs, rubs or gallops. 3+/4 pulses in bilateral dorsalis pedis and posterior tibialis. No distal edema or varicosities. ABD: Soft, nontender, nondistended, normoactive bowel sounds, no hernias, no hepatosplenomegaly, easily palpable costal margins, gynecoid distribution  Extremities: Warm, well perfused, no tenderness or swelling, normal gait/station  Neuro: Sensation and strength grossly intact and symmetrical  Psych: Alert and oriented to person, place, and time. Impression:    Alessandro Ramirez is a 46 y.o. female who is suffering from morbid obesity with a BMI of 37  and comorbidities including hypertension and obstructive sleep apnea - CPAP  who would benefit from bariatric surgery. We have had an extensive discussion with regard to the risks, benefits and likely outcomes of the operation.  We've discussed the restrictive and malabsorptive nature of the gastric bypass and compared and contrasted with the sleeve gastrectomy. The patient understands the likelihood of losing approximately 80% of their excess weight in 12 to 18 months. She also understands the risks including but not limited to bleeding, infection, need for reoperation, ulcers, leaks and strictures, bowel obstruction secondary to adhesions and internal hernias, DVT, PE, heart attack, stroke, and death. Patient also understands risks of inadequate weight loss, excess weight loss, vitamin insufficiency, protein malnutrition, excess skin, and loss of hair. We have reviewed the components of a successful postoperative course including requirement for a high protein, low carbohydrate diet, 60 oz a day of zero calorie liquids, daily vitamin supplementation, daily exercise, regular follow-up, and participation in support groups. At this time we will enroll the patient in our bariatric program, undertake routine laboratory evaluation, chest X-ray, EKG, possible UGI and evaluation by  nutritionist as well as psychologist and pending their satisfactory completion of the preop evaluation, plan to perform a sleeve gatrectomy.

## 2019-02-21 NOTE — LETTER
2/21/2019 3:57 PM 
 
Patient:  Deric Su YOB: 1970 Date of Visit: 2/21/2019 Shahla Briggs MD 
[place] [address] VIA Facsimile: [phone] Dear Shahla Briggs MD, Thank you for referring Ms. Clem Irene to [place] [address] for evaluation and treatment. Below are the relevant portions of my assessment and plan of care. Initial Consultation for Bariatric Surgery Template (Gastric Sleeve) Deric Su is a 46 y.o. female who comes into the office today for initial consultation for the surgical options for the treatment of morbid obesity. The patient initially identified obesity at the age of 53 and at age 23 weighed 155 lbs. She has tried a variety of unsupervised weight-loss attempts including Weight Watchers, registered dietician, Medi-Fast, Bobbe Emmett and Nutrisystem, but has yet to meet with lasting success. Maximum weight lost on a diet is about 15 lbs, but that the weight loss always seems to return. Today, the patient is  Height: 5' 6\" (167.6 cm) tall, Weight: 104.3 kg (230 lb) lbs for a Body mass index is 37.12 kg/m². It is due to the patient's severe obesity, which is further complicated by hypertension, hyperlipidemia and obstructive sleep apnea - CPAP  that the patient is now seeking out bariatric surgery. Past Medical History:  
Diagnosis Date  Abnormal nuclear stress test 3/31/2014  
 fixed ant septal defect likely attenuation normal wall motion. no anginal symptoms  Angina pectoris ([address] 7/31/2018  
 no cad possible vasospastic feels better with amlodipine  FHx: alpha 1 antitrypsin deficiency  H/O chest tube placement RIGHT NOV 2012  High cholesterol  History of gynecological procedure Surrogate mother  Hypertension  Mitral valve prolapse  Nonspecific abnormal electrocardiogram (ECG) (EKG) 3/31/2014 inf q waves has been seen on and off in past no inferior wall defect on recent stress test   
 Pneumothorax, spontaneous, tension RIGHT 2012  Renal cell carcinoma of right kidney ([address] 3/1/13 [id number] cc RCC FG2 s/p left radical nephrectomy for 5.1cm renal mass interpolar in 3/13  Renal mass, right  S/p nephrectomy 3/31/2014  
 for cancer Past Surgical History:  
Procedure Laterality Date  HX BREAST REDUCTION  2017  HX CHOLECYSTECTOMY  HX GYN    HX HYSTERECTOMY Tummy stuck with surgery  HX NEPHRECTOMY Left 3/1/13 Radical, Dr. Korina Gr, [facility] Current Outpatient Medications Medication Sig Dispense Refill  dextroamphetamine-amphetamine (ADDERALL) 10 mg tablet   0  
 amLODIPine (NORVASC) 2.5 mg tablet Take 1 Tab by mouth daily. 30 Tab 6  
 rosuvastatin (CRESTOR) 20 mg tablet Take 1 Tab by mouth nightly. 30 Tab 6  carvedilol (COREG) 12.5 mg tablet Take 1 Tab by mouth two (2) times daily (with meals). 60 Tab 6 Facility-Administered Medications Ordered in Other Visits Medication Dose Route Frequency Provider Last Rate Last Dose  iopamidol (ISOVUE 300) 61 % contrast injection    PRN Stacie Bennett MD   40 mL at 18 1401 Allergies Allergen Reactions  Morphine Itching Social History Tobacco Use  Smoking status: Former Smoker  Smokeless tobacco: Former User Quit date: 3/31/2010  Tobacco comment: quit in  Substance Use Topics  Alcohol use: No  
 Drug use: No  
 
 
Family History Problem Relation Age of Onset  Hypertension Other  High Cholesterol Other  Other Other AIAT deficiency maternal cousin  Ovarian Cancer Mother  High Cholesterol Mother  Hypertension Mother  Cancer Mother  Ovarian Cancer Sister  Hypertension Father  Elevated Lipids Father Indira Dudley Bladder Disease Father Family Status Relation Name Status  Other  (Not Specified)  Other  (Not Specified)  Mother  Alive  Sister  (Not Specified)  Father  Alive Review of Systems:  Positive in BOLD 
 
CONST: Fever, weight loss, fatigue or chills GI: Nausea, vomiting, abdominal pain, change in bowel habits, hematochezia, melena, and GERD INTEG: Dermatitis, abnormal moles HEENT: Recent changes in vision, vertigo, epistaxis, dysphagia and hoarseness CV: Chest pain, palpitations, HTN, edema and varicosities RESP: Cough, shortness of breath, wheezing, hemoptysis, snoring and reactive airway disease : Hematuria, dysuria, frequency, urgency, nocturia and stress urinary incontinence MS: Weakness, joint pain and arthritis ENDO: Diabetes, thyroid disease, polyuria, polydipsia, polyphagia, poor wound healing, heat intolerance, cold intolerance LYMPH/HEME: Anemia, bruising and history of blood transfusions NEURO: Dizziness, headache, fainting, seizures and stroke PSYCH: Anxiety and depression Physical Exam 
 
Visit Vitals Ht 5' 6\" (1.676 m) Wt 104.3 kg (230 lb) BMI 37.12 kg/m² General: 46 y.o.) female in no acute distress. Morbidly obese in hips and abdomen - gynecoid pattern HEENT: Normocephalic, atraumatic, Pupils equal and reactive, nasopharynx clear, oropharynx clear and moist without lesions NECK: Supple, no lymphadenopathy, thyromegaly, carotid bruits or jugular venous distension. trachea midline RESP: Clear to auscultation bilaterally, no wheezes, rhonchi, or rales, normal respiratory excursion CV: Regular rate and rhythm, no murmurs, rubs or gallops. 3+/4 pulses in bilateral dorsalis pedis and posterior tibialis. No distal edema or varicosities. ABD: Soft, nontender, nondistended, normoactive bowel sounds, no hernias, no hepatosplenomegaly, easily palpable costal margins, gynecoid distribution Extremities: Warm, well perfused, no tenderness or swelling, normal gait/station Neuro: Sensation and strength grossly intact and symmetrical 
Psych: Alert and oriented to person, place, and time. Impression: 
 
Cinthia Vail is a 46 y.o. female who is suffering from morbid obesity with a BMI of 37  and comorbidities including hypertension and obstructive sleep apnea - CPAP  who would benefit from bariatric surgery. We have had an extensive discussion with regard to the risks, benefits and likely outcomes of the operation. We've discussed the restrictive and malabsorptive nature of the gastric bypass and compared and contrasted with the sleeve gastrectomy. The patient understands the likelihood of losing approximately 80% of their excess weight in 12 to 18 months. She also understands the risks including but not limited to bleeding, infection, need for reoperation, ulcers, leaks and strictures, bowel obstruction secondary to adhesions and internal hernias, DVT, PE, heart attack, stroke, and death. Patient also understands risks of inadequate weight loss, excess weight loss, vitamin insufficiency, protein malnutrition, excess skin, and loss of hair. We have reviewed the components of a successful postoperative course including requirement for a high protein, low carbohydrate diet, 60 oz a day of zero calorie liquids, daily vitamin supplementation, daily exercise, regular follow-up, and participation in support groups. At this time we will enroll the patient in our bariatric program, undertake routine laboratory evaluation, chest X-ray, EKG, possible UGI and evaluation by  nutritionist as well as psychologist and pending their satisfactory completion of the preop evaluation, plan to perform a sleeve gatrectomy. Thank you very much for your referral of Ms. Jamison Caro. If you have questions, please do not hesitate to call me. I look forward to following Ms. Parmar along with you and will keep you updated as to her progress.   
 
 
 
 
Sincerely, 
 
 
Valerie Ignacio MD

## 2019-02-21 NOTE — COMMUNICATION BODY
Initial Consultation for Bariatric Surgery Template (Gastric Sleeve)    Esteban Oleary is a 46 y.o. female who comes into the office today for initial consultation for the surgical options for the treatment of morbid obesity. The patient initially identified obesity at the age of 53 and at age 23 weighed 155 lbs. She has tried a variety of unsupervised weight-loss attempts including Weight Watchers, registered dietician, Medi-Fast, Bennetta Fillers and Nutrisystem, but has yet to meet with lasting success. Maximum weight lost on a diet is about 15 lbs, but that the weight loss always seems to return. Today, the patient is  Height: 5' 6\" (167.6 cm) tall, Weight: 104.3 kg (230 lb) lbs for a Body mass index is 37.12 kg/m². It is due to the patient's severe obesity, which is further complicated by hypertension, hyperlipidemia and obstructive sleep apnea - CPAP  that the patient is now seeking out bariatric surgery. Past Medical History:   Diagnosis Date    Abnormal nuclear stress test 3/31/2014    fixed ant septal defect likely attenuation normal wall motion. no anginal symptoms     Angina pectoris ([address] 7/31/2018    no cad possible vasospastic feels better with amlodipine    FHx: alpha 1 antitrypsin deficiency     H/O chest tube placement     RIGHT NOV 2012    High cholesterol     History of gynecological procedure     Surrogate mother    Hypertension     Mitral valve prolapse     Nonspecific abnormal electrocardiogram (ECG) (EKG) 3/31/2014    inf q waves has been seen on and off in past no inferior wall defect on recent stress test     Pneumothorax, spontaneous, tension     RIGHT NOV 2012    Renal cell carcinoma of right kidney ([address] 3/1/13    [id number] cc RCC FG2 s/p left radical nephrectomy for 5.1cm renal mass interpolar in 3/13     Renal mass, right     S/p nephrectomy 3/31/2014    for cancer        Past Surgical History:   Procedure Laterality Date    HX BREAST REDUCTION  11/2017    HX CHOLECYSTECTOMY      HX GYN      HX HYSTERECTOMY      Tummy stuck with surgery    HX NEPHRECTOMY Left 3/1/13    Radical, Dr. Yina Alves, [facility]       Current Outpatient Medications   Medication Sig Dispense Refill    dextroamphetamine-amphetamine (ADDERALL) 10 mg tablet   0    amLODIPine (NORVASC) 2.5 mg tablet Take 1 Tab by mouth daily. 30 Tab 6    rosuvastatin (CRESTOR) 20 mg tablet Take 1 Tab by mouth nightly. 30 Tab 6    carvedilol (COREG) 12.5 mg tablet Take 1 Tab by mouth two (2) times daily (with meals).  60 Tab 6     Facility-Administered Medications Ordered in Other Visits   Medication Dose Route Frequency Provider Last Rate Last Dose    iopamidol (ISOVUE 300) 61 % contrast injection    PRN Adelina Gibbons MD   40 mL at 18 1401       Allergies   Allergen Reactions    Morphine Itching       Social History     Tobacco Use    Smoking status: Former Smoker    Smokeless tobacco: Former User     Quit date: 3/31/2010    Tobacco comment: quit in    Substance Use Topics    Alcohol use: No    Drug use: No       Family History   Problem Relation Age of Onset    Hypertension Other     High Cholesterol Other     Other Other         AIAT deficiency maternal cousin    Ovarian Cancer Mother     High Cholesterol Mother     Hypertension Mother     Cancer Mother     Ovarian Cancer Sister     Hypertension Father     Elevated Lipids Father     Gall Bladder Disease Father        Family Status   Relation Name Status    Other  (Not Specified)    Other  (Not Specified)    Mother  Alive    Sister  (Not Specified)    Father  Alive       Review of Systems:  Positive in BOLD    CONST: Fever, weight loss, fatigue or chills  GI: Nausea, vomiting, abdominal pain, change in bowel habits, hematochezia, melena, and GERD   INTEG: Dermatitis, abnormal moles  HEENT: Recent changes in vision, vertigo, epistaxis, dysphagia and hoarseness  CV: Chest pain, palpitations, HTN, edema and varicosities  RESP: Cough, shortness of breath, wheezing, hemoptysis, snoring and reactive airway disease  : Hematuria, dysuria, frequency, urgency, nocturia and stress urinary incontinence   MS: Weakness, joint pain and arthritis  ENDO: Diabetes, thyroid disease, polyuria, polydipsia, polyphagia, poor wound healing, heat intolerance, cold intolerance  LYMPH/HEME: Anemia, bruising and history of blood transfusions  NEURO: Dizziness, headache, fainting, seizures and stroke  PSYCH: Anxiety and depression      Physical Exam    Visit Vitals  Ht 5' 6\" (1.676 m)   Wt 104.3 kg (230 lb)   BMI 37.12 kg/m²               General: 46 y.o.) female in no acute distress. Morbidly obese in hips and abdomen - gynecoid pattern  HEENT: Normocephalic, atraumatic, Pupils equal and reactive, nasopharynx clear, oropharynx clear and moist without lesions  NECK: Supple, no lymphadenopathy, thyromegaly, carotid bruits or jugular venous distension. trachea midline  RESP: Clear to auscultation bilaterally, no wheezes, rhonchi, or rales, normal respiratory excursion  CV: Regular rate and rhythm, no murmurs, rubs or gallops. 3+/4 pulses in bilateral dorsalis pedis and posterior tibialis. No distal edema or varicosities. ABD: Soft, nontender, nondistended, normoactive bowel sounds, no hernias, no hepatosplenomegaly, easily palpable costal margins, gynecoid distribution  Extremities: Warm, well perfused, no tenderness or swelling, normal gait/station  Neuro: Sensation and strength grossly intact and symmetrical  Psych: Alert and oriented to person, place, and time. Impression:    Hussain Vera is a 46 y.o. female who is suffering from morbid obesity with a BMI of 37  and comorbidities including hypertension and obstructive sleep apnea - CPAP  who would benefit from bariatric surgery. We have had an extensive discussion with regard to the risks, benefits and likely outcomes of the operation.  We've discussed the restrictive and malabsorptive nature of the gastric bypass and compared and contrasted with the sleeve gastrectomy. The patient understands the likelihood of losing approximately 80% of their excess weight in 12 to 18 months. She also understands the risks including but not limited to bleeding, infection, need for reoperation, ulcers, leaks and strictures, bowel obstruction secondary to adhesions and internal hernias, DVT, PE, heart attack, stroke, and death. Patient also understands risks of inadequate weight loss, excess weight loss, vitamin insufficiency, protein malnutrition, excess skin, and loss of hair. We have reviewed the components of a successful postoperative course including requirement for a high protein, low carbohydrate diet, 60 oz a day of zero calorie liquids, daily vitamin supplementation, daily exercise, regular follow-up, and participation in support groups. At this time we will enroll the patient in our bariatric program, undertake routine laboratory evaluation, chest X-ray, EKG, possible UGI and evaluation by  nutritionist as well as psychologist and pending their satisfactory completion of the preop evaluation, plan to perform a sleeve gatrectomy.

## 2019-02-21 NOTE — PROGRESS NOTES
Chief Complaint   Patient presents with    Morbid Obesity     consult     Pt ID confirmed    Weight Loss Metrics 2/21/2019 2/21/2019 7/31/2018 7/6/2018 7/2/2018 5/23/2018 4/27/2016   Pre op / Initial Wt 230 - - - - - -   Today's Wt - 230 lb 230 lb 229 lb 229 lb 225 lb 233 lb   BMI - 37.12 kg/m2 36.02 kg/m2 35.87 kg/m2 35.87 kg/m2 35.24 kg/m2 36.48 kg/m2   Ideal Body Wt 137 - - - - - -   Excess Body Wt 93 - - - - - -   Goal Wt 156 - - - - - -   Wt loss to date 0 - - - - - -   % Wt Loss 0 - - - - - -   80% EBW 74.4 - - - - - -       Body mass index is 37.12 kg/m².

## 2019-03-01 ENCOUNTER — DOCUMENTATION ONLY (OUTPATIENT)
Dept: BARIATRICS/WEIGHT MGMT | Age: 49
End: 2019-03-01

## 2019-03-01 ENCOUNTER — HOSPITAL ENCOUNTER (OUTPATIENT)
Dept: BARIATRICS/WEIGHT MGMT | Age: 49
Discharge: HOME | End: 2019-03-01

## 2019-03-01 NOTE — PROGRESS NOTES
[address] [place] [address] [facility], [address]    Patient's Name: Chetan Balbuena   Age: 46 y.o. YOB: 1970   Sex: female    Date:   3/1/2019    Insurance:  [facility]          Session: 1 of 12  Revision:   Surgeon:  Dr. Justina Barone    Height: 5 f 6 Weight:    231      Lbs. BMI: 37.4   Pounds Lost since last month: 0               Pounds Gained since last month: 1      Starting Weight: 230   Previous Months Weight: 230  Overall Pounds Lost: 0 Overall Pounds Gained: 1      Do you smoke? None    Alcohol intake:  Number of drinks at a time:  None  Number of times a week: None    Class Guidelines    Guidelines are reviewed with patient at the start of every class. 1. Patient understands that weight loss trial classes must be consecutive. Patient understands if they miss a class, it is their responsibility to contact me to reschedule class. I will reach out to patient after their first no show. 2.  Patient understands the expectations that weight maintenance/weight loss is expected during the classes. Failure to demonstrate changes may result in one extra month of weight loss trial, followed by going back to see the surgeon. Patient understands that they CAN NOT gain any weight during the weight loss trial.  Gaining weight will result in extra classes. 3. Patient is also instructed to be doing their labs, blood work, psych visit, support group and any other test that the surgeon has used while they are working on their weight loss trial.  4.  Patient was instructed to bring their blue binder to every class and appointment. Other Pertinent Information:     Changes Made Since Last Class:  Paying attention to what she is eating. Eating Habits and Behaviors    Today in class, we reviewed the key diet principles. I have talked to patient about pushing the fluid and working towards 64 ounces per day.     Patient was given ideas of liquids that would be okay. Patient was encouraged to cut out liquid calories, such as soda and sweet tea. We talked about the reasons that sugar sweetened beverages can promote weight gain. Sugar is highly palatable. Excessive consumption of sugar can trigger an exaggerated release of dopamine, which can promote a compulsive drive to consume more sugar sweetened beverages. Also, satiety is not reached with liquid calories the same way it does with solid calories. In class, we also talked about focusing on protein and low carbohydrates. Patient was encouraged during the weight loss trial to keep their carbohydrate less than 100 grams per day and their protein level at 60-80 grams per day. We talked about meal choices and snack ideas. Patient's current diet habits include: 7:00 am: Patient states she will have 1-2 eggs for breakfast with Vesta Spray. She states she may throw some vegetables in it. Patient states she may have a bowl of Raisin Milk with 2% milk with a banana cut up in it. Breakfast-Lunch:  None. She states she doesn't get hungry until 2 pm.  Patient states at 2 pm she will eat whatever. She states she may have a lunch cake to a sandwich of some sorts. She states she doesn't do much fast food, but may do [employer]. Patient states she may do a cookie and coffee. She states yesterday she had an apple. Dinner:  Patient states she may have vegetables, she may have spaghetti. She states sometimes she may have a pot pie. She states sometimes she will have a stir prince. She states sometimes they have a pizza night and will have 2 slices. Patient states she doesn't eat a lot of meat and doesn't eat a lot of fried food. Physical Activity/Exercise    Comments: We talked about exercise. Patient was given reasons of why exercise is so important and how that can help with their long-term success. I have encouraged patient to get a support system to help with the activity.     Currently for activity, patient is doing very little. She states she sometimes do an exercise video, but it otherwise doing very little. Behavior Modification       Comments:  During today's lesson, I also spent some time talking about behavior changes. I talked to patient about the importance of taking vitamins post op and we reviewed the vitamins that patients will be taking post op. Patient will hear this again at pre op class before surgery. Patient had the opportunity to ask questions about these vitamins that will be lifelong. Goals that patient wants to work on includes:  1. Count carbohydrates.    [address] [facility], MS RD  3/1/2019

## 2019-04-26 ENCOUNTER — DOCUMENTATION ONLY (OUTPATIENT)
Dept: BARIATRICS/WEIGHT MGMT | Age: 49
End: 2019-04-26

## 2019-04-26 ENCOUNTER — HOSPITAL ENCOUNTER (OUTPATIENT)
Dept: MAMMOGRAPHY | Age: 49
Discharge: HOME | End: 2019-04-26
Payer: COMMERCIAL

## 2019-04-26 DIAGNOSIS — Z12.31 VISIT FOR SCREENING MAMMOGRAM: ICD-10-CM

## 2019-04-26 PROCEDURE — 77067 SCR MAMMO BI INCL CAD: CPT

## 2019-04-26 NOTE — PROGRESS NOTES
4/26/19:  Patient did not show for her nutrition appointment. She was left a voicemail to reschedule. She was also left a reminder call the previous day.     Marii Lazcano MS RD

## 2019-08-26 RX ORDER — AMLODIPINE BESYLATE 2.5 MG/1
TABLET ORAL
Qty: 30 TAB | Refills: 0 | Status: SHIPPED | OUTPATIENT
Start: 2019-08-26

## 2019-08-26 NOTE — TELEPHONE ENCOUNTER
Spoke with patient and she states she is feeling fine and she will get PCP to fill unless she needs to see us and then she will call to make an appointment.

## 2019-09-09 DIAGNOSIS — I10 ESSENTIAL HYPERTENSION: ICD-10-CM

## 2019-09-09 RX ORDER — ROSUVASTATIN CALCIUM 20 MG/1
TABLET, COATED ORAL
Qty: 30 TAB | Refills: 0 | OUTPATIENT
Start: 2019-09-09

## 2021-03-19 NOTE — PROGRESS NOTES
Cath holding summary    Patient escorted to cath holding from waiting area ambulatory, alert and oriented x 4, voicing no complaints. Changed into gown and placed on monitor, Lab results, med rec and H&P reviewed on chart. PIV x 2 inserted without difficulty. Family to bedside. GI Discharge Instructions Endoscopy      3/19/2021    During your exam, the physician:    Removed polyps  Lab results will be called/mailed to you within 7-10 business days.  If you have not heard from the doctor within 10 days, call the office for results:  Dr. Rolando Fu [phone]    DIET INSTRUCTIONS:  Resume your regular diet    PRESCRIPTIONS/MEDICATIONS  No new prescriptions given today    A RESPONSIBLE ADULT MUST ACCOMPANY YOU AND DRIVE YOU HOME    You had the following procedure(s) today:   Colonoscopy     Avoid anti-inflammatory drugs, (Nuprin, Ibuprofen, Motrin, Advil, etc.) for 10 days.     Following sedation, your judgement, perception and coordination are impaired for a minimum of 24 hours.      Therefore:  · Do not drive.  Do not return to work today.  · Do not operate appliances or machinery that require quick reaction time  · Do not sign legal documents or be involved in work decisions  · Do not smoke or drink alcoholic beverages for 24 hours  · Plan to spend a few hours resting before resuming your normal routine    Please call your physician in the event that you experience any of the following or proceed to the nearest hospital in the event of an emergency:     COLONOSCOPY  Fever  Severe abdominal distention or pain. Some mild distention and/or cramping are normal after these procedures but should pass within an hour or two with the passage of air.  Rectal bleeding more than blood streaking on the toilet  tissue  Nausea or Vomiting    If you have any questions or concerns, contact Dr. Rolando Fu [phone]    ADDITIONAL INSTRUCTIONS:     Diverticulosis    Diverticulosis means that small pouches have formed in the wall of your large intestine (colon). Most often, this problem causes no symptoms and is common as people age. But the pouches in the colon are at risk of becoming infected. When this happens, the condition is called diverticulitis. Although most people with diverticulosis never  develop diverticulitis, it is still not uncommon. Rectal bleeding can also occur and in less common situations, a type of colon inflammation called colitis.  While most people do not have symptoms, some people with diverticulosis may have:  · Abdominal cramps and pain  · Bloating  · Constipation  · Change in bowel habits  Causes  The exact cause of diverticulosis (and diverticulitis) has not been proved, but a few things are associated with the condition:  · Low-fiber diet  · Constipation  · Lack of exercise  Your healthcare provider will talk with you about how to manage your condition. Diet changes may be all that are needed to help control diverticulosis and prevent progression to diverticulitis. If you develop diverticulitis, you will likely need other treatments.  Home care  You may be told to take fiber supplements daily. Fiber adds bulk to the stool so that it passes through the colon more easily. Stool softeners may be recommended. You may also be given medications for pain relief. Be sure to take all medications as directed.  In the past, people were told to avoid corn, nuts, and seeds. This is no longer necessary.  Follow these guidelines when caring for yourself at home:  · Eat unprocessed foods that are high in fiber. Whole grains, fruits, and vegetables are good choices.  · Drink 6 to 8 glasses of water every day unless your healthcare provider has you limit how much fluid you should have.  · Watch for changes in your bowel movements. Tell your provider if you notice any changes.  · Begin an exercise program. Ask your provider how to get started. Generally, walking is the best.  · Get plenty of rest and sleep.  Follow-up care  Follow up with your healthcare provider, or as advised. Regular visits may be needed to check on your health. Sometimes special procedures such as colonoscopy, are needed after an episode of diverticulitis or blooding. Be sure to keep all your appointments.  If a stool sample was  taken, or cultures were done, you should be told if they are positive, or if your treatment needs to be changed. You can call as directed for the results.  If X-rays were done, a radiologist will look at them. You will be told if there is a change in your treatment.  If antibiotics were prescribed, be sure to finish them all.  When to seek medical advice  Call your healthcare provider right away if any of these occur:  · Fever of 100.4°F (38°C) or higher, or as directed by your healthcare provider  · Severe cramps in the lower left side of the abdomen or pain that is getting worse  · Tenderness in the lower left side of the abdomen or worsening pain throughout the abdomen  · Diarrhea or constipation that doesn't get better within 24 hours  · Nausea and vomiting  · Bleeding from the rectum  Call 911  Call 911 if any of the following occur:  · Trouble breathing  · Confusion  · Very drowsy or trouble awakening  · Fainting or loss of consciousness  · Rapid heart rate  · Chest pain  Date Last Reviewed: 12/30/2015 © 2000-2018 [employer]. [address] [place] [address]. All rights reserved. This information is not intended as a substitute for professional medical care. Always follow your healthcare professional's instructions.      High-Fiber Diet  Fiber is in fruits, vegetables, cereals, and grains. Fiber passes through your body undigested. A high-fiber diet helps food move through your intestinal tract. The added bulk is helpful in preventing constipation. In people with diverticulosis, fiber helps clean out the pouches along the colon wall. It also prevents new pouches from forming. A high-fiber diet reduces the risk of colon cancer. It also lowers blood cholesterol and prevents high blood sugar in people with diabetes.    The fiber-rich foods listed below should be part of your diet. If you are not used to high-fiber foods, start with 1 or 2 foods from this list. Every 3 to 4 days add a new  one to your diet. Do this until you are eating 4 high-fiber foods per day. This should give you 20 to 35 grams of fiber a day. It is also important to drink a lot of water when you are on this diet. You should have 6 to 8 glasses of water a day. Water makes the fiber swell and increases the benefit.  Foods high in dietary fiber  The following foods are high in dietary fiber:  · Breads. Breads made with 100% whole-wheat flour; sherlyn, wheat, or rye crackers; whole-grain tortillas, bran muffins.  · Cereals. Whole-grain and bran cereals with bran (shredded wheat, wheat flakes, raisin bran, corn bran); oatmeal, rolled oats, granola, and brown rice.  · Fruits. Fresh fruits and their edible skins (pears, prunes, raisins, berries, apples, and apricots); bananas, citrus fruit, mangoes, pineapple; and prune juice.  · Nuts. Any nuts and seeds.  · Vegetables. Best served raw or lightly cooked. All types, especially: green peas, celery, eggplant, potatoes, spinach, broccoli, [place] sprouts, winter squash, carrots, cauliflower, soybeans, lentils, and fresh and dried beans of all kinds.  · Other. Popcorn, any spices.  Date Last Reviewed: 8/1/2016  © 7647-4239 [employer]. [address] [place] [address]. All rights reserved. This information is not intended as a substitute for professional medical care. Always follow your healthcare professional's instructions.      Eating a High-Fiber Diet  Fiber is what gives strength and structure to plants. Most grains, beans, vegetables, and fruits contain fiber. Foods rich in fiber are often low in calories and fat, and they fill you up more. They may also reduce your risks for certain health problems. To find out the amount of fiber in canned, packaged, or frozen foods, read the Nutrition Facts label. It tells you how much fiber is in one serving.     Types of fiber and their benefits  There are two types of fiber: insoluble and soluble. They both aid digestion  and help you maintain a healthy weight.   · Insoluble fiber. This is found in whole grains, cereals, certain fruits and vegetables such as apple skin, corn, and carrots. Insoluble fiber may prevent constipation and reduce the risk for certain types of cancer. It's called insoluble because it doesn't dissolve in water.  · Soluble fiber. This type of fiber is in oats, beans, and certain fruits and vegetables such as strawberries and peas. Soluble fiber can reduce cholesterol, which may help lower the risk for heart disease. It also helps control blood sugar levels.  Look for high-fiber foods  Try these foods to add fiber to your diet:  · Whole-grain breads and cereals. Try to eat 6 to 8 ounces a day. Include wheat and oat bran cereals, whole-wheat muffins or toast, and corn tortillas in your meals.  · Fruits. Try to eat 2 cups a day. Apples, oranges, strawberries, pears, and bananas are good sources. (Note: Fruit juice is low in fiber.)  · Vegetables. Try to eat at least 2.5 cups a day. Add asparagus, carrots, broccoli, peas, and corn to your meals.  · Beans. One cup of cooked lentils gives you over 15 grams of fiber. Try navy beans, lentils, and chickpeas.  · Seeds. A small handful of seeds gives you about 3 grams of fiber. Try sunflower or chantelle seeds.  Keep track of your fiber  Keep track of how much fiber you eat. Start by reading food labels. Then eat a variety of foods high in fiber. As you start to eat more fiber, ask your healthcare provider how much water you should be drinking to keep your digestive system working smoothly.   Aim for a certain amount of fiber in your diet each day. The daily value for fiber is 25 grams a day. But some experts advise that women under 50 eat 25 to 28 grams per day and that men under 50 eat 30 to 38 grams per day. After age 50, your daily fiber needs drop to 22 grams for women and 28 grams for men.   Before you reach for the fiber supplements, think about this. Fiber is found  naturally in healthy whole foods. It gives you that feeling of fullness after you eat. Taking fiber supplements or eating fiber-enriched foods will not give you this full feeling.   Your fiber intake is a good measure for the quality of your overall diet. If you are missing out on your daily amount of fiber, you may be lacking other important nutrients as well.   Brandon last reviewed this educational content on 7/1/2019  © 1897-1828 [employer]. [address] [place] [address], [place] [address]. All rights reserved. This information is not intended as a substitute for professional medical care. Always follow your healthcare professional's instructions.

## 2021-07-23 ENCOUNTER — HOSPITAL ENCOUNTER (OUTPATIENT)
Dept: MAMMOGRAPHY | Age: 51
Discharge: HOME | End: 2021-07-23
Payer: COMMERCIAL

## 2021-07-23 DIAGNOSIS — Z12.31 ENCOUNTER FOR SCREENING MAMMOGRAM FOR MALIGNANT NEOPLASM OF BREAST: ICD-10-CM

## 2021-07-23 PROCEDURE — 77063 BREAST TOMOSYNTHESIS BI: CPT

## 2021-08-03 PROBLEM — E66.01 MORBID OBESITY (HCC): Status: RESOLVED | Noted: 2019-02-21 | Resolved: 2021-08-03

## 2022-03-19 PROBLEM — G47.33 OSA ON CPAP: Status: ACTIVE | Noted: 2018-07-31

## 2022-03-19 PROBLEM — E66.01 SEVERE OBESITY (BMI 35.0-39.9) WITH COMORBIDITY (HCC): Status: ACTIVE | Noted: 2018-05-23

## 2022-03-19 PROBLEM — I20.9 ANGINA PECTORIS (HCC): Status: ACTIVE | Noted: 2018-07-31

## 2022-03-29 NOTE — PROGRESS NOTES
AMBULATORY PROGRESS NOTE      Patient: Stan Agrawal             MRN: [id number]     SSN: [id number] Body mass index is 34.95 kg/m². YOB: 1970     AGE: 46 y.o. EX: female    PCP: Dionicio Garcia MD       IMPRESSION //  DIAGNOSIS AND TREATMENT PLAN        Stan Agrawal has a diagnosis of:      She has severe pain and tenderness to the right Achilles tendon insertion point and has a very large prominence, and calcific bone spur to the right hindfoot long Achilles tendon insertion point. She is felt this for treatment, which consist of anti-inflammatories active modification shoewear changes, recommendations for MRI of the right hindfoot rule out and assess the integrity of the Achilles tendon, but to rule out any significant interstitial tendinopathy or tearing of the tendon distally. I talked her about conservative care and surgical intervention. DIAGNOSES    1. Achilles tendinitis of right lower extremity    2. Achilles tendinosis    3. Right foot pain        Orders Placed This Encounter    AMB POC XRAY, FOOT; COMPLETE, 3+ VIEW     ASK ALL FEMALE PATIENTS IF PREGNANT     Order Specific Question:   Reason for Exam     Answer:   PAIN    MRI ANKLE RT WO CONT     Standing Status:   Future     Standing Expiration Date:   4/30/2022     Order Specific Question:   Arthrogram study     Answer:   No     Order Specific Question:   Reason for Exam     Answer:   achiles tendon pain            PLAN:    1. Obtain 3-View XR of right foot  2. I will order an MRI of right ankle    RTO after  MRI    Patient Instructions   ACHILOTRAIN           Please follow up with your PCP for any health maintenance as recommended         Stan Agrawal  expresses understanding of the diagnosis, treatment plan, and all of their proposed questions were answered to their satisfaction. Patient education has been provided re the diagnoses.          HPI //  [address] [place] [address] 46 y.o. female who is a/an  new patient, presenting to my outpatient office for evaluation of  the following chief complaint(s):     Chief Complaint   Patient presents with    Ankle Pain     Bump on back of ankle       Kat Borja presents today w/ right hindfoot pain along the achilles tendon onset 3-4 months . She shows me a prominence over her right achilles tendon. She states if her prominence bumps against something she has to rest her foot for about an hour due to intense pain. She recalls a step stool contusing her right hindfoot. She was given a night splint and  instructed to do eccentric stretches to alleviate her right hindoot pain. She mentions wearing heels alleviate her hindfoot pain , but wearing shoes w/ hard back exacerbates her hindfoot pain. Visit Vitals  Pulse 93   Temp 97.8 °F (36.6 °C) (Temporal)   Ht 5' 6.5\" (1.689 m)   Wt 219 lb 12.8 oz (99.7 kg)   SpO2 98%   BMI 34.95 kg/m²       Appearance: Alert, well appearing and pleasant patient who is in no distress, oriented to person, place/time, and who follows commands. Normal dress/motor activity/thought processes/memory. This patient is accompanied in the examination room by her  self. Patient arrives to office via: without assistive device:     Psychiatric:  Normal Affect/mood. Judgement, behavior, and conduct are appropriate. Speech normal in context and clarity, memory intact grossly, no involuntary movements - tremors. H EENT (2): Head normocephalic & atraumatic. Eye: pupils are round// EOM are intact // Neck: ROM WNL  // Hearings Intact   Respiratory: Breathing non labored     ANKLE/FOOT right    Gait: normal  Tenderness: Moderate over  Achilles tendon  Cutaneous: prominence over right achilles tendon   Joint Motion:  WNL   Joint / Tendon Stability:  No Ankle or Subtalar instability or joint laxity.                        No peroneal sublux ability or dislocation  Alignment: neutral Hindfoot,    Neuro Motor/Sensory: NL/NL  Vascular: NL foot/ankle pulses, swelling about ankle joint  Lymphatics: No extremity lymphedema, No calf swelling, no tenderness to calf muscles. CHART REVIEW     Ashlee Walters has been experiencing pain and discomfort confirmed as outlined in the pain assessment outlined below.  was reviewed by Kelsey Singh MD on 3/30/2022. Pain Assessment  3/30/2022   Location of Pain Ankle   Location Modifiers Left   Severity of Pain 8   Quality of Pain Throbbing; Sharp;Dull;Aching; Other (Comment)   Quality of Pain Comment stabbing   Frequency of Pain Constant   Aggravating Factors Other (Comment)   Aggravating Factors Comment flexing, bumping it   Limiting Behavior Yes   Relieving Factors Nothing   Result of Injury No        Ashlee Walters  has a past medical history of Abnormal nuclear stress test (3/31/2014), Angina pectoris ([address] (2018), FHx: alpha 1 antitrypsin deficiency, H/O chest tube placement, High cholesterol, History of gynecological procedure, Hypertension, Mitral valve prolapse, Nonspecific abnormal electrocardiogram (ECG) (EKG) (3/31/2014), Pneumothorax, spontaneous, tension, Renal cell carcinoma of right kidney ([address] (3/1/13), Renal mass, left, S/p nephrectomy (3/31/2014), and Sleep apnea. Patients is employed at:          has a past medical history of Abnormal nuclear stress test (3/31/2014), Angina pectoris ([address] (2018), FHx: alpha 1 antitrypsin deficiency, H/O chest tube placement, High cholesterol, History of gynecological procedure, Hypertension, Mitral valve prolapse, Nonspecific abnormal electrocardiogram (ECG) (EKG) (3/31/2014), Pneumothorax, spontaneous, tension, Renal cell carcinoma of right kidney ([address] (3/1/13), Renal mass, left, S/p nephrectomy (3/31/2014), and Sleep apnea.     has a past surgical history that includes hx breast reduction (2017); hx nephrectomy (Left, 3/1/13); hx cholecystectomy; hx abdominoplasty; hx  section (); and hx hysterectomy. family history includes Cancer in her maternal aunt, mother, and sister; Elevated Lipids in her father; Noreene Gilbert in her father; High Cholesterol in her mother and another family member; Hypertension in her father, mother, and another family member; Other in an other family member; Ovarian Cancer in her mother and sister. Current Outpatient Medications   Medication Sig    amLODIPine (NORVASC) 2.5 mg tablet TAKE 1 TABLET BY MOUTH DAILY    dextroamphetamine-amphetamine (ADDERALL) 10 mg tablet     rosuvastatin (CRESTOR) 20 mg tablet Take 1 Tab by mouth nightly.  carvedilol (COREG) 12.5 mg tablet Take 1 Tab by mouth two (2) times daily (with meals). No current facility-administered medications for this visit. Allergies   Allergen Reactions    Morphine Itching     Social History     Occupational History    Not on file   Tobacco Use    Smoking status: Former Smoker    Smokeless tobacco: Former User     Quit date: 3/31/2010    Tobacco comment: quit in 1996   Vaping Use    Vaping Use: Never used   Substance and Sexual Activity    Alcohol use: No    Drug use: No    Sexual activity: Not Currently       reports that she has quit smoking. She quit smokeless tobacco use about 12 years ago. She reports that she does not drink alcohol and does not use drugs. DIAGNOSTIC LAB DATA      No results found for: HBA1C, DRX6RCHX, LRJ3TRGJ //   Lab Results   Component Value Date/Time    Glucose 106 (H) 07/02/2018 10:57 AM        No results found for: GTP1STHY, PJO1TQRB      No results found for: VITD3, XQVID2, XQVID3, XQVID, VD3RIA, SWKN94FTAMS     Drug Screen Most Recent Result Date    No resulted procedures found. REVIEW OF SYSTEMS : 3/30/2022  ALL BELOW ARE Negative except : SEE HPI     All other systems reviewed and are negative. 12 point review of systems otherwise negative unless noted in HPI.       RADIOGRAPHS// IMAGING//DIAGNOSTIC DATA     Orders Placed This Encounter    AMB POC XRAY, FOOT; COMPLETE, 3+ VIEW    MRI ANKLE RT WO CONT        ANKLE X RAYS 3 VIEWS RIGHT  X RAYS AT [address] [place] [address]  3/30/2022    FINDINGS:     SOFT TISSUES:              Absent soft tissue swelling, No soft tissue calcifications, No Calcified blood vessels     Soft tissue swelling location:    None to fibula region        None medial aspect    None dorsal midfoot/forefoot  No radiopaque foreign body, abnormal lucency, or focal swelling noted within soft tissues. OSSEOUS:     No fractures, subluxations, dislocations   Bone spur to inferior aspect of calcaneus is  moderate   Bone spur to superior calcaneus region is           large SIZED CALCIFIC INSERTIONAL BONE SPUR IS PRESENT   Mineralization: suggests no Osteopenia or no Osteoporosis    ALIGNMENT:    Ankle mortise alignment is congruent. Tibial plafond and talar dome intact      No Osteochondral defects seen    No Ankle joint effusion seen         I have personally reviewed these images of the above study. The interpretation of this study is my professional opinion. Onel Serrano MD  3/30/2022  9:31 AM                 I have spent 30 minutes reviewing the previous notes, reviewing diagnostic studies [Advanced  Imaging, Diagnostic test results (x-rays)] and had a direct face to face with the patient discussing the diagnosis and importance of compliance with the treatment and plan. There is  discussion for the potential for surgery, answering all questions, as well as documenting patient care coordination for this individual on the day of the visit. Disclaimer:     Sections of this note are dictated using utilizing voice recognition software, which may have resulted in some phonetic based errors in grammar and contents. Even though attempts were made to correct all the mistakes, some may have been missed, and remained in the body of the document. If questions arise, please contact our department.      An electronic signature was used to authenticate this note. Trish Dacosta may have a reminder for a \"due or due soon\" health maintenance. I have asked that she contact her primary care provider for follow-up on this health maintenance. Patient Instructions   ACHILOTRAIN           Please follow up with your PCP for any health maintenance as recommended.               Prosper Heard, as dictated byDuy  3/30/2022  1:03 PM

## 2022-03-30 ENCOUNTER — OFFICE VISIT (OUTPATIENT)
Dept: ORTHOPEDIC SURGERY | Age: 52
End: 2022-03-30
Payer: COMMERCIAL

## 2022-03-30 VITALS
BODY MASS INDEX: 34.5 KG/M2 | HEART RATE: 93 BPM | WEIGHT: 219.8 LBS | HEIGHT: 67 IN | TEMPERATURE: 97.8 F | OXYGEN SATURATION: 98 %

## 2022-03-30 DIAGNOSIS — M79.671 RIGHT FOOT PAIN: ICD-10-CM

## 2022-03-30 DIAGNOSIS — M67.88 ACHILLES TENDINOSIS: ICD-10-CM

## 2022-03-30 DIAGNOSIS — M76.61 ACHILLES TENDINITIS OF RIGHT LOWER EXTREMITY: Primary | ICD-10-CM

## 2022-03-30 PROCEDURE — 73630 X-RAY EXAM OF FOOT: CPT | Performed by: ORTHOPAEDIC SURGERY

## 2022-03-30 PROCEDURE — 99203 OFFICE O/P NEW LOW 30 MIN: CPT | Performed by: ORTHOPAEDIC SURGERY

## 2022-04-27 ENCOUNTER — HOSPITAL ENCOUNTER (OUTPATIENT)
Age: 52
Discharge: HOME | End: 2022-04-27
Attending: ORTHOPAEDIC SURGERY
Payer: COMMERCIAL

## 2022-04-27 DIAGNOSIS — M76.61 ACHILLES TENDINITIS OF RIGHT LOWER EXTREMITY: ICD-10-CM

## 2022-04-27 DIAGNOSIS — M67.88 ACHILLES TENDINOSIS: ICD-10-CM

## 2022-04-27 PROCEDURE — 73721 MRI JNT OF LWR EXTRE W/O DYE: CPT

## 2022-04-29 ENCOUNTER — PATIENT MESSAGE (OUTPATIENT)
Dept: ORTHOPEDIC SURGERY | Age: 52
End: 2022-04-29

## 2022-07-26 ENCOUNTER — DOCUMENTATION ONLY (OUTPATIENT)
Dept: ORTHOPEDIC SURGERY | Age: 52
End: 2022-07-26

## 2022-07-26 NOTE — PROGRESS NOTES
I spoke with her 7/26/2022 11:44 AM  .  RE: Her Achilles tendon, and her RIGHT calcific degenerative changes. She informs me informs me that nothing is really change in her degree of symptoms. She still having pain discomfort in her right hindfoot, and has modified her activities. She has started a new job, I believe in [place], I called her today, on her lunch break. So we discussed having her to schedule appointment to see me. MRI Results (most recent):  Results from [place] encounter on 04/27/22    MRI ANKLE RT WO CONT    Narrative  Multisequence multiplanar MR images of the right ankle were obtained. HISTORY: Posterior pain. Pain. Severe Achilles tendinosis with surrounding inflammation and mild retrocalcaneal  bursitis. Moderate edema at the calcaneal insertion. No rupture. No mass. Plantar fascia is intact and unremarkable. No ankle joint effusion. Small subchondral edema in the medial talar dome. Cortex is intact. Sinus tarsi remains intact. Mild posterior tibialis tenosynovitis. Flexor tendons are intact. Deltoid ligament is intact. Mild peroneus longus and brevis tenosynovitis. Tibiofibular and talofibular  ligaments are intact with mild surrounding edema. Calcaneofibular ligament is  intact. Extensor tendons unremarkable at the ankle level. Intact Lisfranc ligament. Impression  1. Severe Achilles tendinosis with retrocalcaneal bursitis and surrounding  inflammation. 2. Mild posterior tibialis tenosynovitis. Mild peroneus longus and brevis  tenosynovitis. 3. Small area of subchondral edema in the medial talar dome. Overlying cortex  remains intact.               Radha Garza MD  7/26/2022  11:43 AM

## 2022-11-17 ENCOUNTER — TRANSCRIBE ORDER (OUTPATIENT)
Dept: SCHEDULING | Age: 52
End: 2022-11-17

## 2022-11-17 DIAGNOSIS — Z12.31 VISIT FOR SCREENING MAMMOGRAM: Primary | ICD-10-CM

## 2022-12-20 ENCOUNTER — HOSPITAL ENCOUNTER (OUTPATIENT)
Dept: MAMMOGRAPHY | Age: 52
Discharge: HOME | End: 2022-12-20
Payer: COMMERCIAL

## 2022-12-20 DIAGNOSIS — Z12.31 VISIT FOR SCREENING MAMMOGRAM: ICD-10-CM

## 2022-12-20 PROCEDURE — 77063 BREAST TOMOSYNTHESIS BI: CPT

## 2023-12-08 ENCOUNTER — TRANSCRIBE ORDERS (OUTPATIENT)
Facility: HOSPITAL | Age: 53
End: 2023-12-08

## 2023-12-08 DIAGNOSIS — Z78.0 ASYMPTOMATIC POSTMENOPAUSAL STATUS: Primary | ICD-10-CM

## 2023-12-08 DIAGNOSIS — Z12.31 VISIT FOR SCREENING MAMMOGRAM: ICD-10-CM

## 2024-04-12 ENCOUNTER — HOSPITAL ENCOUNTER (OUTPATIENT)
Facility: HOSPITAL | Age: 54
Discharge: HOME | End: 2024-04-12
Payer: COMMERCIAL

## 2024-04-12 ENCOUNTER — HOSPITAL ENCOUNTER (OUTPATIENT)
Facility: HOSPITAL | Age: 54
End: 2024-04-12
Payer: COMMERCIAL

## 2024-04-12 DIAGNOSIS — Z78.0 ASYMPTOMATIC POSTMENOPAUSAL STATUS: ICD-10-CM

## 2024-04-12 DIAGNOSIS — Z12.31 VISIT FOR SCREENING MAMMOGRAM: ICD-10-CM

## 2024-04-12 PROCEDURE — 77063 BREAST TOMOSYNTHESIS BI: CPT

## 2025-04-25 ENCOUNTER — TRANSCRIBE ORDERS (OUTPATIENT)
Facility: HOSPITAL | Age: 55
End: 2025-04-25

## 2025-04-25 DIAGNOSIS — Z12.31 OTHER SCREENING MAMMOGRAM: Primary | ICD-10-CM

## 2025-06-26 ENCOUNTER — TRANSCRIBE ORDERS (OUTPATIENT)
Facility: HOSPITAL | Age: 55
End: 2025-06-26

## 2025-06-26 DIAGNOSIS — J32.0 CHRONIC MAXILLARY SINUSITIS: Primary | ICD-10-CM

## 2025-06-26 DIAGNOSIS — J34.3 HYPERTROPHY OF NASAL TURBINATES: ICD-10-CM

## 2025-06-26 DIAGNOSIS — J42 CHRONIC BRONCHITIS, UNSPECIFIED CHRONIC BRONCHITIS TYPE (HCC): ICD-10-CM

## (undated) DEVICE — PROCEDURE KIT FLUID MGMT 10 FR CUST MAINFOLD

## (undated) DEVICE — PACK PROCEDURE SURG VASC CATH 161 MMC LF

## (undated) DEVICE — TR BAND RADIAL ARTERY COMPRESSION DEVICE: Brand: TR BAND

## (undated) DEVICE — GLIDESHEATH SLENDER STAINLESS STEEL KIT: Brand: GLIDESHEATH SLENDER

## (undated) DEVICE — SET FLD ADMIN 3 W STPCOCK FIX FEM L BOR 1IN

## (undated) DEVICE — DRAPE,ANGIO,BRACH,STERILE,38X44: Brand: MEDLINE

## (undated) DEVICE — PRESSURE MONITORING SET: Brand: TRUWAVE

## (undated) DEVICE — RADIFOCUS OPTITORQUE ANGIOGRAPHIC CATHETER: Brand: OPTITORQUE